# Patient Record
Sex: FEMALE | Race: OTHER | NOT HISPANIC OR LATINO
[De-identification: names, ages, dates, MRNs, and addresses within clinical notes are randomized per-mention and may not be internally consistent; named-entity substitution may affect disease eponyms.]

---

## 2020-11-30 ENCOUNTER — APPOINTMENT (OUTPATIENT)
Dept: SURGERY | Facility: CLINIC | Age: 65
End: 2020-11-30

## 2020-12-09 ENCOUNTER — APPOINTMENT (OUTPATIENT)
Dept: SURGERY | Facility: CLINIC | Age: 65
End: 2020-12-09
Payer: COMMERCIAL

## 2020-12-09 PROCEDURE — 99214 OFFICE O/P EST MOD 30 MIN: CPT | Mod: 95

## 2020-12-10 ENCOUNTER — NON-APPOINTMENT (OUTPATIENT)
Age: 65
End: 2020-12-10

## 2020-12-30 ENCOUNTER — LABORATORY RESULT (OUTPATIENT)
Age: 65
End: 2020-12-30

## 2021-01-04 ENCOUNTER — APPOINTMENT (OUTPATIENT)
Dept: SURGERY | Facility: CLINIC | Age: 66
End: 2021-01-04
Payer: COMMERCIAL

## 2021-01-04 ENCOUNTER — NON-APPOINTMENT (OUTPATIENT)
Age: 66
End: 2021-01-04

## 2021-01-04 VITALS
TEMPERATURE: 97 F | HEART RATE: 81 BPM | WEIGHT: 99 LBS | DIASTOLIC BLOOD PRESSURE: 62 MMHG | BODY MASS INDEX: 17.54 KG/M2 | HEIGHT: 63 IN | SYSTOLIC BLOOD PRESSURE: 104 MMHG

## 2021-01-04 DIAGNOSIS — Z78.9 OTHER SPECIFIED HEALTH STATUS: ICD-10-CM

## 2021-01-04 PROCEDURE — 99214 OFFICE O/P EST MOD 30 MIN: CPT

## 2021-01-04 PROCEDURE — 99072 ADDL SUPL MATRL&STAF TM PHE: CPT

## 2021-01-04 NOTE — CONSULT LETTER
[Dear  ___] : Dear  [unfilled], [Consult Letter:] : I had the pleasure of evaluating your patient, [unfilled]. [Please see my note below.] : Please see my note below. [Consult Closing:] : Thank you very much for allowing me to participate in the care of this patient.  If you have any questions, please do not hesitate to contact me. [FreeTextEntry3] : Sincerely,\par \par Christiano Malhotra MD, Colon and Rectal Surgery\par \par [employer] [facility]\par [address] [place] [address]\par [facility], 3rd Floor\par [place], [place] [address]\par Tel [phone] ext [phone]\par Fax [phone]\par

## 2021-01-04 NOTE — PHYSICAL EXAM
[Abdomen Masses] : No abdominal masses [Abdomen Tenderness] : ~T No ~M abdominal tenderness [No HSM] : no hepatosplenomegaly [Respiratory Effort] : normal respiratory effort [Normal Rate and Rhythm] : normal rate and rhythm [de-identified] : awake, alert and in no acute distress

## 2021-01-04 NOTE — HISTORY OF PRESENT ILLNESS
[FreeTextEntry1] : Patient is a 65F with no PMH who presents for evaluation of sigmoid colon adenocarcinoma.  Patient originally had a positive cologuard test and underwent colonoscopy with Dr. Barros in 11/2020.  She was found to have a 1.8cm sigmoid colon polyp that was removed.  Pathology showed moderately differentiated invasive adenocarcinoma within 1mm of the cauterized margin. Our pathology report shows adenocarcinoma extending to the inked and cauterized deep margin.  Patient was previously spoken to over the phone regarding her pathology.  CTAP shows no evidence of metastatic disease.  She presents today to discuss surgery. Patient states she feels well and is in her normal state of health. Patient denies fevers, chills, nausea, vomiting, abdominal pain, constipation, diarrhea, blood in his stool or unexpected weight loss.  Patient denies a family history of colon cancer rectal cancer or inflammatory bowel disease.

## 2021-01-04 NOTE — ASSESSMENT
[FreeTextEntry1] : 65F with adenocarcinoma containing sigmoid colon polyp and inadequate margin\par \par I had a long conversation with the patient and her son.  We discussed the inadequate margin and the current recommendation for surgical resection. We will need CT chest and CEA level.  Additionally she will be referred to Dr. Barros for colonoscopy and tattoo placement. I recommended that he undergo robot assisted laparoscopic sigmoidectomy possible open, possible ostomy.  All risks benefits and alternatives were discussed including bleeding, infection, damage to surrounding structures including the ureters, anastomotic leak, anastomotic stricture, cardiopulmonary events, thromboembolic events and hernia. We expect a 3-7 day hospital stay and 6-8 week recovery. Patient expressed understanding and was in agreement with the plan.\par

## 2021-01-05 ENCOUNTER — NON-APPOINTMENT (OUTPATIENT)
Age: 66
End: 2021-01-05

## 2021-01-05 LAB
ALBUMIN SERPL ELPH-MCNC: 4.6 G/DL
ALP BLD-CCNC: 54 U/L
ALT SERPL-CCNC: 14 U/L
ANION GAP SERPL CALC-SCNC: 12 MMOL/L
APTT BLD: 30.5 SEC
AST SERPL-CCNC: 19 U/L
BASOPHILS # BLD AUTO: 0.05 K/UL
BASOPHILS NFR BLD AUTO: 0.6 %
BILIRUB SERPL-MCNC: 0.4 MG/DL
BUN SERPL-MCNC: 11 MG/DL
CALCIUM SERPL-MCNC: 9.4 MG/DL
CEA SERPL-MCNC: 4.4 NG/ML
CHLORIDE SERPL-SCNC: 105 MMOL/L
CO2 SERPL-SCNC: 26 MMOL/L
CREAT SERPL-MCNC: 0.9 MG/DL
EOSINOPHIL # BLD AUTO: 0.15 K/UL
EOSINOPHIL NFR BLD AUTO: 1.8 %
GLUCOSE SERPL-MCNC: 122 MG/DL
HCT VFR BLD CALC: 41.8 %
HGB BLD-MCNC: 12.9 G/DL
IMM GRANULOCYTES NFR BLD AUTO: 0.5 %
LYMPHOCYTES # BLD AUTO: 1.95 K/UL
LYMPHOCYTES NFR BLD AUTO: 23.8 %
MAN DIFF?: NORMAL
MCHC RBC-ENTMCNC: 28.7 PG
MCHC RBC-ENTMCNC: 30.9 G/DL
MCV RBC AUTO: 93.1 FL
MONOCYTES # BLD AUTO: 0.58 K/UL
MONOCYTES NFR BLD AUTO: 7.1 %
NEUTROPHILS # BLD AUTO: 5.42 K/UL
NEUTROPHILS NFR BLD AUTO: 66.2 %
PLATELET # BLD AUTO: 282 K/UL
POTASSIUM SERPL-SCNC: 4.9 MMOL/L
PROT SERPL-MCNC: 6.9 G/DL
RBC # BLD: 4.49 M/UL
RBC # FLD: 14.8 %
SODIUM SERPL-SCNC: 143 MMOL/L
WBC # FLD AUTO: 8.19 K/UL

## 2021-02-04 ENCOUNTER — APPOINTMENT (OUTPATIENT)
Dept: HEMATOLOGY ONCOLOGY | Facility: CLINIC | Age: 66
End: 2021-02-04
Payer: COMMERCIAL

## 2021-02-04 ENCOUNTER — OUTPATIENT (OUTPATIENT)
Dept: OUTPATIENT SERVICES | Facility: HOSPITAL | Age: 66
LOS: 1 days | Discharge: HOME | End: 2021-02-04

## 2021-02-04 VITALS — DIASTOLIC BLOOD PRESSURE: 71 MMHG | SYSTOLIC BLOOD PRESSURE: 137 MMHG

## 2021-02-04 VITALS
BODY MASS INDEX: 17.54 KG/M2 | TEMPERATURE: 97.7 F | RESPIRATION RATE: 16 BRPM | SYSTOLIC BLOOD PRESSURE: 150 MMHG | HEART RATE: 74 BPM | HEIGHT: 63 IN | WEIGHT: 99 LBS | DIASTOLIC BLOOD PRESSURE: 75 MMHG

## 2021-02-04 PROCEDURE — 99245 OFF/OP CONSLTJ NEW/EST HI 55: CPT

## 2021-02-04 RX ORDER — NEOMYCIN SULFATE 500 MG/1
500 TABLET ORAL
Qty: 6 | Refills: 0 | Status: DISCONTINUED | COMMUNITY
Start: 2021-01-04 | End: 2021-02-04

## 2021-02-04 RX ORDER — BISACODYL 5 MG/1
5 TABLET ORAL
Qty: 4 | Refills: 0 | Status: DISCONTINUED | COMMUNITY
Start: 2021-01-04 | End: 2021-02-04

## 2021-02-04 RX ORDER — POLYETHYLENE GLYCOL 3350 17 G/17G
17 POWDER, FOR SOLUTION ORAL
Qty: 1 | Refills: 0 | Status: DISCONTINUED | COMMUNITY
Start: 2021-01-04 | End: 2021-02-04

## 2021-02-04 RX ORDER — METRONIDAZOLE 500 MG/1
500 TABLET ORAL
Qty: 6 | Refills: 0 | Status: DISCONTINUED | COMMUNITY
Start: 2021-01-04 | End: 2021-02-04

## 2021-02-05 NOTE — ASSESSMENT
[FreeTextEntry1] : In summary this is a 65 year old woman with a diagnosis of well differentiated VASU adenocarcinoma of the sigmoid colon  s/p polypectomy with involved margins. The procedure was done on 11/7/20.\par On repeat colonoscopy in 1/21 the site of the polyp was not visible and therefore could not be tattoed.\par \par Pt has been eval by surgery.\par The case was also presented at our General Tumor Board.\par Given this is an unusual situation the treatment recommendations can vary from close observation to proceeding with surgery. Both options were presented to the pt.\par I had a long discussion with the pt about pros and cons of both approaches.\par She herself prefers close observation.\par She is scheduled to undergo a PET scan later this week.\par We will repeat labs including CEA.\par Pt will have a repeat colonoscopy in 3/21.\par \par I also discussed the case with Dr Malhotra, who has decided to monitor the pt closely at this time.\par She will RTC after her next colonoscopy.

## 2021-02-05 NOTE — HISTORY OF PRESENT ILLNESS
[de-identified] : This is a 65F who underwent colonoscopy on 11/7/2020 by Dr. Barros after having a positive Cologuard test.. \par She was found to have a 1.8cm sigmoid colon polyp that was removed. Pathology showed moderately differentiated invasive adenocarcinoma within 1mm of the cauterized margin. Our pathology report shows adenocarcinoma extending to the inked and cauterized deep margin. \par \par \par She underwent CTAP on 11/24 that showed no evidence of disease. \par She has seen by Dr Malhotra and recommended sigmoid resection after repeat colonoscopy and tattooing of the site of cancer.\par However at the repeat colonoscopy the site of polyp could not be found. \par \par At this time the management of the pt is unclear. She has been advised observation vs possible surgery without preop identification of the polyp site, which could mean a larger resection than would have been needed.\par Pt does not report any change of bowel function. \par No abd pain. No wt loss.\par No bleeding [place]

## 2021-02-10 DIAGNOSIS — C18.7 MALIGNANT NEOPLASM OF SIGMOID COLON: ICD-10-CM

## 2021-02-15 ENCOUNTER — LABORATORY RESULT (OUTPATIENT)
Age: 66
End: 2021-02-15

## 2021-02-17 ENCOUNTER — APPOINTMENT (OUTPATIENT)
Dept: SURGERY | Facility: CLINIC | Age: 66
End: 2021-02-17

## 2021-02-17 LAB
ALBUMIN SERPL ELPH-MCNC: 4.2 G/DL
ALP BLD-CCNC: 54 U/L
ALT SERPL-CCNC: 12 U/L
ANION GAP SERPL CALC-SCNC: 14 MMOL/L
AST SERPL-CCNC: 16 U/L
BILIRUB SERPL-MCNC: 0.3 MG/DL
BUN SERPL-MCNC: 13 MG/DL
CALCIUM SERPL-MCNC: 9.4 MG/DL
CEA SERPL-MCNC: 5 NG/ML
CHLORIDE SERPL-SCNC: 102 MMOL/L
CO2 SERPL-SCNC: 24 MMOL/L
CREAT SERPL-MCNC: 0.8 MG/DL
GLUCOSE SERPL-MCNC: 87 MG/DL
POTASSIUM SERPL-SCNC: 4.2 MMOL/L
PROT SERPL-MCNC: 6.6 G/DL
SODIUM SERPL-SCNC: 140 MMOL/L

## 2021-02-22 ENCOUNTER — APPOINTMENT (OUTPATIENT)
Dept: SURGERY | Facility: CLINIC | Age: 66
End: 2021-02-22
Payer: SELF-PAY

## 2021-02-22 PROCEDURE — 99213 OFFICE O/P EST LOW 20 MIN: CPT | Mod: 95

## 2021-02-22 NOTE — HISTORY OF PRESENT ILLNESS
[FreeTextEntry1] : Patient is a 65F with no PMH who presents for evaluation of sigmoid colon adenocarcinoma.  Patient originally had a positive cologuard test and underwent colonoscopy with Dr. Barros in 11/2020.  She was found to have a 1.8cm sigmoid colon polyp that was removed.  Pathology showed moderately differentiated invasive adenocarcinoma within 1mm of the cauterized margin. Our pathology report shows adenocarcinoma extending to the inked and cauterized deep margin.  CTAP shows no evidence of metastatic disease.  Patient was recommended to undergo sigmoidectomy after tattoo placement.  Unfortunately the polypectomy site could not be identified endoscopically.  The patient was presented at tumor board and the recommendation was for surveillance.  Patient denies fevers, chills, nausea, vomiting, abdominal pain, constipation, diarrhea, blood in his stool or unexpected weight loss.  Patient denies a family history of colon cancer rectal cancer or inflammatory bowel disease.

## 2021-02-22 NOTE — ASSESSMENT
[FreeTextEntry1] : 65F with adenocarcinoma containing sigmoid colon polyp and inadequate margin\par \par I discussed the current situation with the patient and her son.  We discussed surveillance.  She is scheduled for repeat colonoscopy and CT in 5/2021.  We will see her back in 6/2021

## 2021-02-22 NOTE — CONSULT LETTER
[Dear  ___] : Dear  [unfilled], [Courtesy Letter:] : I had the pleasure of seeing your patient, [unfilled], in my office today. [Please see my note below.] : Please see my note below. [Consult Closing:] : Thank you very much for allowing me to participate in the care of this patient.  If you have any questions, please do not hesitate to contact me. [FreeTextEntry3] : Sincerely,\par \par Christiano Malhotra MD, Colon and Rectal Surgery\par \par [employer] at [facility]\par [address] [place] [address]\par [facility], 3rd Floor\par [place], [place] [address]\par Tel [phone] ext [phone]\par Fax [phone]\par  [DrGeovany  ___] : Dr. VARGAS

## 2021-05-06 ENCOUNTER — APPOINTMENT (OUTPATIENT)
Dept: HEMATOLOGY ONCOLOGY | Facility: CLINIC | Age: 66
End: 2021-05-06

## 2021-06-28 ENCOUNTER — APPOINTMENT (OUTPATIENT)
Dept: HEMATOLOGY ONCOLOGY | Facility: CLINIC | Age: 66
End: 2021-06-28

## 2022-06-15 ENCOUNTER — APPOINTMENT (OUTPATIENT)
Dept: GERIATRICS | Facility: CLINIC | Age: 67
End: 2022-06-15
Payer: MEDICARE

## 2022-06-15 ENCOUNTER — OUTPATIENT (OUTPATIENT)
Dept: OUTPATIENT SERVICES | Facility: HOSPITAL | Age: 67
LOS: 1 days | Discharge: HOME | End: 2022-06-15

## 2022-06-15 VITALS
WEIGHT: 97 LBS | DIASTOLIC BLOOD PRESSURE: 69 MMHG | TEMPERATURE: 97.6 F | RESPIRATION RATE: 18 BRPM | SYSTOLIC BLOOD PRESSURE: 157 MMHG | HEART RATE: 77 BPM | OXYGEN SATURATION: 95 % | BODY MASS INDEX: 14.37 KG/M2 | HEIGHT: 69 IN

## 2022-06-15 DIAGNOSIS — Z82.49 FAMILY HISTORY OF ISCHEMIC HEART DISEASE AND OTHER DISEASES OF THE CIRCULATORY SYSTEM: ICD-10-CM

## 2022-06-15 DIAGNOSIS — C80.1 MALIGNANT (PRIMARY) NEOPLASM, UNSPECIFIED: ICD-10-CM

## 2022-06-15 DIAGNOSIS — F17.200 NICOTINE DEPENDENCE, UNSPECIFIED, UNCOMPLICATED: ICD-10-CM

## 2022-06-15 PROCEDURE — 99204 OFFICE O/P NEW MOD 45 MIN: CPT | Mod: GC

## 2022-06-15 RX ORDER — CHROMIUM 200 MCG
25 MCG TABLET ORAL
Refills: 0 | Status: DISCONTINUED | COMMUNITY
Start: 2021-01-04 | End: 2022-06-15

## 2022-06-15 NOTE — ASSESSMENT
[FreeTextEntry1] : 68 yo F with PMH of sigmoid colon adenocarcinoma ( follows Dr. Parada and Dr. Malhotra) presents for initial evaluation. Last seen by by oncology and colorectal surgery last year was told no need to be followed anymore as repeat colonoscopy was normal. The patient notes that she was told she does not need to follow anymore. All ros negative. \par \par Reports wants to set up care with new pcp because she moved closer to here. \par \par \par # Preventive visit\par - referral given for mammogram / GYN for pap smear / DEXA scan\par - vaccinated for covid19 x2 with moderna, interested in booster when comes back from [place] \par \par # h/o adenocarcinoma of sigmoid colon\par - pt wants to defer follow up with oncology until after she comes back from [place] \par \par # Elevated blood pressure\par - patient notes bp at home normal\par - advised to bring logs of blood pressure for next visit \par \par # Current every day smoker\par - smoking cessation counseling advised \par - patient had PET scan done in 2021 , advised to bring the report at next visit \par \par # f/u in 3 months

## 2022-06-15 NOTE — HISTORY OF PRESENT ILLNESS
[0] : 2) Feeling down, depressed, or hopeless: Not at all (0) [PHQ-2 Negative - No further assessment needed] : PHQ-2 Negative - No further assessment needed [FreeTextEntry1] : 66 yo F with PMH of sigmoid colon adenocarcinoma ( follows Dr. Parada and Dr. Malhotra) presents for initial evaluation. Last seen by by oncology and colorectal surgery last year was told no need to be followed anymore as repeat colonoscopy was normal. The patient notes that she was told she does not need to follow anymore. Patient reports she is traveling to [place] tomorrow to visit her son. All ros negative. \par  [[id number]] : 0

## 2022-06-23 DIAGNOSIS — Z82.49 FAMILY HISTORY OF ISCHEMIC HEART DISEASE AND OTHER DISEASES OF THE CIRCULATORY SYSTEM: ICD-10-CM

## 2022-06-23 DIAGNOSIS — C18.7 MALIGNANT NEOPLASM OF SIGMOID COLON: ICD-10-CM

## 2022-06-23 DIAGNOSIS — F17.200 NICOTINE DEPENDENCE, UNSPECIFIED, UNCOMPLICATED: ICD-10-CM

## 2022-07-01 LAB
ALBUMIN SERPL ELPH-MCNC: 4.7 G/DL
ALP BLD-CCNC: 63 U/L
ALT SERPL-CCNC: 15 U/L
ANION GAP SERPL CALC-SCNC: 14 MMOL/L
AST SERPL-CCNC: 22 U/L
BASOPHILS # BLD AUTO: 0.06 K/UL
BASOPHILS NFR BLD AUTO: 0.7 %
BILIRUB SERPL-MCNC: 0.3 MG/DL
BUN SERPL-MCNC: 9 MG/DL
CALCIUM SERPL-MCNC: 9.6 MG/DL
CHLORIDE SERPL-SCNC: 101 MMOL/L
CHOLEST SERPL-MCNC: 224 MG/DL
CO2 SERPL-SCNC: 27 MMOL/L
CREAT SERPL-MCNC: 0.74 MG/DL
EGFR: 89 ML/MIN/1.73M2
EOSINOPHIL # BLD AUTO: 0.08 K/UL
EOSINOPHIL NFR BLD AUTO: 0.9 %
ESTIMATED AVERAGE GLUCOSE: 123 MG/DL
GLUCOSE SERPL-MCNC: 111 MG/DL
HBA1C MFR BLD HPLC: 5.9 %
HCT VFR BLD CALC: 41.6 %
HDLC SERPL-MCNC: 70 MG/DL
HGB BLD-MCNC: 13.4 G/DL
IMM GRANULOCYTES NFR BLD AUTO: 0.2 %
LDLC SERPL CALC-MCNC: 138 MG/DL
LYMPHOCYTES # BLD AUTO: 2.29 K/UL
LYMPHOCYTES NFR BLD AUTO: 26.8 %
MAN DIFF?: NORMAL
MCHC RBC-ENTMCNC: 29.6 PG
MCHC RBC-ENTMCNC: 32.2 G/DL
MCV RBC AUTO: 91.8 FL
MONOCYTES # BLD AUTO: 0.62 K/UL
MONOCYTES NFR BLD AUTO: 7.2 %
NEUTROPHILS # BLD AUTO: 5.49 K/UL
NEUTROPHILS NFR BLD AUTO: 64.2 %
NONHDLC SERPL-MCNC: 153 MG/DL
PLATELET # BLD AUTO: 271 K/UL
POTASSIUM SERPL-SCNC: 4.5 MMOL/L
PROT SERPL-MCNC: 7.4 G/DL
RBC # BLD: 4.53 M/UL
RBC # FLD: 14.6 %
SODIUM SERPL-SCNC: 142 MMOL/L
TRIGL SERPL-MCNC: 79 MG/DL
TSH SERPL-ACNC: 0.39 UIU/ML
WBC # FLD AUTO: 8.56 K/UL

## 2023-05-30 ENCOUNTER — LABORATORY RESULT (OUTPATIENT)
Age: 68
End: 2023-05-30

## 2023-05-31 ENCOUNTER — NON-APPOINTMENT (OUTPATIENT)
Age: 68
End: 2023-05-31

## 2023-05-31 ENCOUNTER — OUTPATIENT (OUTPATIENT)
Dept: OUTPATIENT SERVICES | Facility: HOSPITAL | Age: 68
LOS: 1 days | End: 2023-05-31
Payer: MEDICARE

## 2023-05-31 ENCOUNTER — APPOINTMENT (OUTPATIENT)
Dept: GERIATRICS | Facility: CLINIC | Age: 68
End: 2023-05-31

## 2023-05-31 ENCOUNTER — APPOINTMENT (OUTPATIENT)
Dept: GERIATRICS | Facility: CLINIC | Age: 68
End: 2023-05-31
Payer: MEDICARE

## 2023-05-31 VITALS
BODY MASS INDEX: 16.59 KG/M2 | SYSTOLIC BLOOD PRESSURE: 127 MMHG | WEIGHT: 93.6 LBS | HEIGHT: 63 IN | HEART RATE: 82 BPM | TEMPERATURE: 98.5 F | DIASTOLIC BLOOD PRESSURE: 74 MMHG | OXYGEN SATURATION: 97 %

## 2023-05-31 DIAGNOSIS — Z00.00 ENCOUNTER FOR GENERAL ADULT MEDICAL EXAMINATION W/OUT ABNORMAL FINDINGS: ICD-10-CM

## 2023-05-31 DIAGNOSIS — Z00.00 ENCOUNTER FOR GENERAL ADULT MEDICAL EXAMINATION WITHOUT ABNORMAL FINDINGS: ICD-10-CM

## 2023-05-31 DIAGNOSIS — C18.7 MALIGNANT NEOPLASM OF SIGMOID COLON: ICD-10-CM

## 2023-05-31 PROCEDURE — 99213 OFFICE O/P EST LOW 20 MIN: CPT

## 2023-05-31 PROCEDURE — 85027 COMPLETE CBC AUTOMATED: CPT

## 2023-05-31 PROCEDURE — 36415 COLL VENOUS BLD VENIPUNCTURE: CPT

## 2023-05-31 PROCEDURE — 83036 HEMOGLOBIN GLYCOSYLATED A1C: CPT

## 2023-05-31 PROCEDURE — 99213 OFFICE O/P EST LOW 20 MIN: CPT | Mod: GC

## 2023-05-31 PROCEDURE — 80061 LIPID PANEL: CPT

## 2023-05-31 PROCEDURE — 84443 ASSAY THYROID STIM HORMONE: CPT

## 2023-05-31 PROCEDURE — 80053 COMPREHEN METABOLIC PANEL: CPT

## 2023-05-31 PROCEDURE — 82378 CARCINOEMBRYONIC ANTIGEN: CPT

## 2023-05-31 NOTE — PHYSICAL EXAM
[No Acute Distress] : no acute distress [Well Nourished] : well nourished [Well Developed] : well developed [Well-Appearing] : well-appearing [PERRL] : pupils equal round and reactive to light [EOMI] : extraocular movements intact [Normal Outer Ear/Nose] : the outer ears and nose were normal in appearance [Normal Oropharynx] : the oropharynx was normal [No JVD] : no jugular venous distention [Supple] : supple [No Respiratory Distress] : no respiratory distress  [No Accessory Muscle Use] : no accessory muscle use [Clear to Auscultation] : lungs were clear to auscultation bilaterally [Normal Rate] : normal rate  [Regular Rhythm] : with a regular rhythm [Normal S1, S2] : normal S1 and S2 [No Murmur] : no murmur heard [No Edema] : there was no peripheral edema [Soft] : abdomen soft [Non Tender] : non-tender [Non-distended] : non-distended [No Masses] : no abdominal mass palpated [No CVA Tenderness] : no CVA  tenderness [No Spinal Tenderness] : no spinal tenderness [Normal] : no CVA or spinal tenderness [No Joint Swelling] : no joint swelling [Grossly Normal Strength/Tone] : grossly normal strength/tone [Coordination Grossly Intact] : coordination grossly intact [No Focal Deficits] : no focal deficits [Speech Grossly Normal] : speech grossly normal [Normal Affect] : the affect was normal [Normal Mood] : the mood was normal [Normal Insight/Judgement] : insight and judgment were intact

## 2023-06-01 NOTE — HISTORY OF PRESENT ILLNESS
[FreeTextEntry1] : 68 year-old female here for routine follow-up visit  [de-identified] : Patient is a 69 yo-female w/PMH colon adenocarcinoma ( follows Dr. Parada and Dr. Malhotra) presents for  follow-up visitLast seen by by oncology and colorectal surgery last year was told no need to be followed anymore as repeat colonoscopy was normal.\par The patient reports that her most recent colonoscopy was last year which  was normal and she is due for next colonoscopy in 3 years. She follows with GI and will be seeing them again next year.  Denies any recent weight loss. Denies any bleeding per rectum, which has been resolved since her last colonoscopy. \par Patient reports no major complaints, patient is active at home, does house work, cooking, and driving. No changes to vision or hearing, and seeing optho tomorrow.

## 2023-06-01 NOTE — PLAN
[FreeTextEntry1] : #Colonic adenocarcinoma\par -Continue surveillance per GI, patient has follow-up with GI next year in addition to colonoscopy (Having colonoscopies done by Dr. Daniel at another institution).\par \par #Preventative visit\par - Routine labs\par - GI follow-up next year\par - Patient counseled to continue healthy diet, avoid sugary drinks, and continue exercise\par -GYN referral given\par - Mammo + DEXA referrals to be completed in [place]

## 2023-06-01 NOTE — ASSESSMENT
[FreeTextEntry1] : 68 yo F with PMH of sigmoid colon adenocarcinoma ( follows Dr. Parada and Dr. Malhotra) presents for routinefollow-up visit Last seen by by oncology and colorectal surgery last year was told no need to be followed anymore as repeat colonoscopy was normal. The patient notes that she was told she does not need to follow anymore. All ROS noted to be negative on today's visit, patient is active at home and feels well overall. \par

## 2023-06-01 NOTE — REVIEW OF SYSTEMS
4 [Fever] : no fever [Chills] : no chills [Fatigue] : no fatigue [Night Sweats] : no night sweats [Recent Change In Weight] : ~T no recent weight change [Pain] : no pain [Vision Problems] : no vision problems [Chest Pain] : no chest pain [Palpitations] : no palpitations [Lower Ext Edema] : no lower extremity edema [Orthopnea] : no orthopnea [Shortness Of Breath] : no shortness of breath [Wheezing] : no wheezing [Abdominal Pain] : no abdominal pain [Cough] : no cough [Nausea] : no nausea [Constipation] : no constipation [Diarrhea] : diarrhea [Vomiting] : no vomiting [Melena] : no melena [Dysuria] : no dysuria [Incontinence] : no incontinence [Frequency] : no frequency [Joint Pain] : no joint pain [Joint Stiffness] : no joint stiffness [Muscle Pain] : no muscle pain

## 2023-06-02 ENCOUNTER — OUTPATIENT (OUTPATIENT)
Dept: OUTPATIENT SERVICES | Facility: HOSPITAL | Age: 68
LOS: 1 days | End: 2023-06-02
Payer: MEDICARE

## 2023-06-02 ENCOUNTER — RESULT REVIEW (OUTPATIENT)
Age: 68
End: 2023-06-02

## 2023-06-02 DIAGNOSIS — Z13.820 ENCOUNTER FOR SCREENING FOR OSTEOPOROSIS: ICD-10-CM

## 2023-06-02 PROCEDURE — 77080 DXA BONE DENSITY AXIAL: CPT

## 2023-06-03 DIAGNOSIS — Z13.820 ENCOUNTER FOR SCREENING FOR OSTEOPOROSIS: ICD-10-CM

## 2024-02-26 NOTE — PHYSICAL EXAM
0 (no pain/absence of nonverbal indicators of pain) [Fully active, able to carry on all pre-disease performance without restriction] : Status 0 - Fully active, able to carry on all pre-disease performance without restriction [Normal] : affect appropriate

## 2024-06-12 ENCOUNTER — APPOINTMENT (OUTPATIENT)
Dept: GERIATRICS | Facility: CLINIC | Age: 69
End: 2024-06-12
Payer: MEDICARE

## 2024-06-12 VITALS
TEMPERATURE: 98.2 F | DIASTOLIC BLOOD PRESSURE: 78 MMHG | BODY MASS INDEX: 16.32 KG/M2 | SYSTOLIC BLOOD PRESSURE: 132 MMHG | WEIGHT: 92.13 LBS | HEIGHT: 63 IN

## 2024-06-12 DIAGNOSIS — F17.200 NICOTINE DEPENDENCE, UNSPECIFIED, UNCOMPLICATED: ICD-10-CM

## 2024-06-12 DIAGNOSIS — C18.7 MALIGNANT NEOPLASM OF SIGMOID COLON: ICD-10-CM

## 2024-06-12 DIAGNOSIS — J44.9 CHRONIC OBSTRUCTIVE PULMONARY DISEASE, UNSPECIFIED: ICD-10-CM

## 2024-06-12 PROCEDURE — G0439: CPT

## 2024-06-12 RX ORDER — ALBUTEROL SULFATE 90 UG/1
108 (90 BASE) AEROSOL, METERED RESPIRATORY (INHALATION) EVERY 4 HOURS
Qty: 3 | Refills: 3 | Status: ACTIVE | COMMUNITY
Start: 2024-06-12 | End: 1900-01-01

## 2024-06-12 NOTE — PHYSICAL EXAM
[Alert] : alert [Healthy Appearing] : healthy appearing [No Acute Distress] : in no acute distress [Normal Voice/Communication] : normal voice/communication [Sclera] : the sclera and conjunctiva were normal [EOMI] : extraocular movements were intact [Normal Outer Ear/Nose] : the ears and nose were normal in appearance [No Neck Mass] : no neck mass was observed [Supple] : the neck was supple [JVD] : there was jugular-venous distention [No Respiratory Distress] : no respiratory distress [Respiration, Rhythm And Depth] : normal respiratory rhythm and effort [Auscultation Breath Sounds / Voice Sounds] : lungs were clear to auscultation bilaterally [Normal S1, S2] : normal S1 and S2 [Heart Rate And Rhythm] : heart rate was normal and rhythm regular [Heart Sounds Gallop] : no gallops [Edema] : edema was not present [Pedal Pulses Normal] : the pedal pulses are present [No Varicosities] : there were no varicosital changes [Normal Appearance] : normal in appearance [Breast Palpation Mass] : no palpable masses [Abdomen Tenderness] : non-tender [Abdomen Soft] : soft [Axillary Lymph Nodes Enlarged Bilaterally] : axillary [No CVA Tenderness] : no CVA  tenderness [No Spinal Tenderness] : no spinal tenderness [Normal Gait] : normal gait [No Clubbing, Cyanosis] : no clubbing or cyanosis of the fingernails [Involuntary Movements] : no involuntary movements were seen [Motor Tone] : muscle strength and tone were normal [Normal Color / Pigmentation] : normal skin color and pigmentation [No Focal Deficits] : no focal deficits [Oriented To Time, Place, And Person] : oriented to person, place, and time [Normal Affect] : the affect was normal [Recent Memory Normal] : recent memory was not impaired [Normal Insight/Judgment] : insight and judgment were intact [Normal Mood] : the mood was normal [Remote Memory Normal] : remote memory was not impaired [de-identified] : no axillary nodes palpable

## 2024-06-12 NOTE — REVIEW OF SYSTEMS
[Abdominal Pain] : no abdominal pain [Constipation] : no constipation [Diarrhea] : no diarrhea [Negative] : Heme/Lymph

## 2024-06-12 NOTE — HISTORY OF PRESENT ILLNESS
[FreeTextEntry1] : 70 yo AA F doing very well, no recent hospitalization, no new chronic medication, weight stable, but fluctuating depending on the diet. no falls, no SOB, no cough, no chest pain. She is still a smoker, " a little more than a pack a day", but motivated to quit. She had bronchitis which was treated at the urgent care center, CXR showed COPD-related change, started on Ventolin PRN. She received RSV vaccine 06/11/2024.   She has a follow up with Dr Jack.  [Patient reported osteoporosis screening was normal] : Patient reported osteoporosis screening was normal [Patient reported hearing was normal] : Patient reported hearing was normal [Patient reported colon/rectal/cancer screening was normal] : Patient reported colon/rectal cancer screening was normal [TextBox_25] : osteopenia [TextBox_37] : wears glasses [TextBox_19] : follows, hx of adenocarcinoma [FreeTextEntry4] : referred [FreeTextEntry6] : referred [No falls in past year] : Patient reported no falls in the past year [Completely Independent] : Completely independent. [0] : 2) Feeling down, depressed, or hopeless: Not at all (0) [PHQ-2 Negative - No further assessment needed] : PHQ-2 Negative - No further assessment needed [[id number]] : 0

## 2024-06-12 NOTE — ASSESSMENT
[FreeTextEntry1] : #Hx of adenocarcinoma of the colon: normal colonoscopy in 2022 follows with dr Barros, has follow up scheduled, past records reviewed  #Current smoker, COPD change on CXR: use albuterol PRN, will need CT chest for lung CA screening - counselling about quitting provided  - albuterol refill given  #Osteopenia: will monitor with DEXA every 5 years  #HCM: screening mammography, gyn referral, CT chest, blood work given

## 2024-10-10 ENCOUNTER — OUTPATIENT (OUTPATIENT)
Dept: OUTPATIENT SERVICES | Facility: HOSPITAL | Age: 69
LOS: 1 days | End: 2024-10-10

## 2024-10-10 DIAGNOSIS — Z00.00 ENCOUNTER FOR GENERAL ADULT MEDICAL EXAMINATION WITHOUT ABNORMAL FINDINGS: ICD-10-CM

## 2024-10-11 DIAGNOSIS — Z00.00 ENCOUNTER FOR GENERAL ADULT MEDICAL EXAMINATION WITHOUT ABNORMAL FINDINGS: ICD-10-CM

## 2025-01-01 ENCOUNTER — RESULT REVIEW (OUTPATIENT)
Age: 70
End: 2025-01-01

## 2025-01-01 ENCOUNTER — INPATIENT (INPATIENT)
Facility: HOSPITAL | Age: 70
LOS: 0 days | DRG: 853 | End: 2025-04-10
Attending: SURGERY | Admitting: SURGERY
Payer: MEDICARE

## 2025-01-01 VITALS
HEART RATE: 113 BPM | RESPIRATION RATE: 18 BRPM | DIASTOLIC BLOOD PRESSURE: 88 MMHG | SYSTOLIC BLOOD PRESSURE: 125 MMHG | WEIGHT: 89.95 LBS | OXYGEN SATURATION: 93 %

## 2025-01-01 VITALS — OXYGEN SATURATION: 100 %

## 2025-01-01 DIAGNOSIS — A41.9 SEPSIS, UNSPECIFIED ORGANISM: ICD-10-CM

## 2025-01-01 LAB
-  K. PNEUMONIAE GROUP: SIGNIFICANT CHANGE UP
ABO RH CONFIRMATION: SIGNIFICANT CHANGE UP
ALBUMIN SERPL ELPH-MCNC: 1.5 G/DL — LOW (ref 3.5–5.2)
ALBUMIN SERPL ELPH-MCNC: 3.8 G/DL — SIGNIFICANT CHANGE UP (ref 3.5–5.2)
ALP SERPL-CCNC: 75 U/L — SIGNIFICANT CHANGE UP (ref 30–115)
ALP SERPL-CCNC: 88 U/L — SIGNIFICANT CHANGE UP (ref 30–115)
ALT FLD-CCNC: 20 U/L — SIGNIFICANT CHANGE UP (ref 0–41)
ALT FLD-CCNC: 51 U/L — HIGH (ref 0–41)
ANION GAP SERPL CALC-SCNC: 10 MMOL/L — SIGNIFICANT CHANGE UP (ref 7–14)
ANION GAP SERPL CALC-SCNC: 16 MMOL/L — HIGH (ref 7–14)
ANION GAP SERPL CALC-SCNC: 6 MMOL/L — LOW (ref 7–14)
APTT BLD: 27 SEC — SIGNIFICANT CHANGE UP (ref 27–39.2)
APTT BLD: 52.5 SEC — HIGH (ref 27–39.2)
AST SERPL-CCNC: 27 U/L — SIGNIFICANT CHANGE UP (ref 0–41)
AST SERPL-CCNC: 83 U/L — HIGH (ref 0–41)
BASOPHILS # BLD AUTO: 0 K/UL — SIGNIFICANT CHANGE UP (ref 0–0.2)
BASOPHILS # BLD AUTO: 0.04 K/UL — SIGNIFICANT CHANGE UP (ref 0–0.2)
BASOPHILS NFR BLD AUTO: 0 % — SIGNIFICANT CHANGE UP (ref 0–1)
BASOPHILS NFR BLD AUTO: 0.8 % — SIGNIFICANT CHANGE UP (ref 0–1)
BILIRUB DIRECT SERPL-MCNC: 0.7 MG/DL — HIGH (ref 0–0.3)
BILIRUB INDIRECT FLD-MCNC: 0.3 MG/DL — SIGNIFICANT CHANGE UP (ref 0.2–1.2)
BILIRUB SERPL-MCNC: 0.9 MG/DL — SIGNIFICANT CHANGE UP (ref 0.2–1.2)
BILIRUB SERPL-MCNC: 1 MG/DL — SIGNIFICANT CHANGE UP (ref 0.2–1.2)
BUN SERPL-MCNC: 20 MG/DL — SIGNIFICANT CHANGE UP (ref 10–20)
BUN SERPL-MCNC: 23 MG/DL — HIGH (ref 10–20)
BUN SERPL-MCNC: 28 MG/DL — HIGH (ref 10–20)
CALCIUM SERPL-MCNC: 10.2 MG/DL — SIGNIFICANT CHANGE UP (ref 8.4–10.5)
CALCIUM SERPL-MCNC: 7.4 MG/DL — LOW (ref 8.4–10.5)
CALCIUM SERPL-MCNC: 7.6 MG/DL — LOW (ref 8.4–10.5)
CHLORIDE SERPL-SCNC: 110 MMOL/L — SIGNIFICANT CHANGE UP (ref 98–110)
CHLORIDE SERPL-SCNC: 112 MMOL/L — HIGH (ref 98–110)
CHLORIDE SERPL-SCNC: 94 MMOL/L — LOW (ref 98–110)
CO2 SERPL-SCNC: 15 MMOL/L — LOW (ref 17–32)
CO2 SERPL-SCNC: 21 MMOL/L — SIGNIFICANT CHANGE UP (ref 17–32)
CO2 SERPL-SCNC: 25 MMOL/L — SIGNIFICANT CHANGE UP (ref 17–32)
CREAT SERPL-MCNC: 1.1 MG/DL — SIGNIFICANT CHANGE UP (ref 0.7–1.5)
CREAT SERPL-MCNC: 1.3 MG/DL — SIGNIFICANT CHANGE UP (ref 0.7–1.5)
CREAT SERPL-MCNC: 1.7 MG/DL — HIGH (ref 0.7–1.5)
EGFR: 32 ML/MIN/1.73M2 — LOW
EGFR: 32 ML/MIN/1.73M2 — LOW
EGFR: 45 ML/MIN/1.73M2 — LOW
EGFR: 45 ML/MIN/1.73M2 — LOW
EGFR: 55 ML/MIN/1.73M2 — LOW
EGFR: 55 ML/MIN/1.73M2 — LOW
EOSINOPHIL # BLD AUTO: 0 K/UL — SIGNIFICANT CHANGE UP (ref 0–0.7)
EOSINOPHIL # BLD AUTO: 0 K/UL — SIGNIFICANT CHANGE UP (ref 0–0.7)
EOSINOPHIL NFR BLD AUTO: 0 % — SIGNIFICANT CHANGE UP (ref 0–8)
EOSINOPHIL NFR BLD AUTO: 0 % — SIGNIFICANT CHANGE UP (ref 0–8)
GAS PNL BLDA: SIGNIFICANT CHANGE UP
GLUCOSE BLDC GLUCOMTR-MCNC: 12 MG/DL — CRITICAL LOW (ref 70–99)
GLUCOSE BLDC GLUCOMTR-MCNC: 13 MG/DL — CRITICAL LOW (ref 70–99)
GLUCOSE BLDC GLUCOMTR-MCNC: 145 MG/DL — HIGH (ref 70–99)
GLUCOSE BLDC GLUCOMTR-MCNC: 15 MG/DL — CRITICAL LOW (ref 70–99)
GLUCOSE BLDC GLUCOMTR-MCNC: 211 MG/DL — HIGH (ref 70–99)
GLUCOSE BLDC GLUCOMTR-MCNC: 26 MG/DL — CRITICAL LOW (ref 70–99)
GLUCOSE BLDC GLUCOMTR-MCNC: 31 MG/DL — CRITICAL LOW (ref 70–99)
GLUCOSE BLDC GLUCOMTR-MCNC: 33 MG/DL — CRITICAL LOW (ref 70–99)
GLUCOSE BLDC GLUCOMTR-MCNC: 35 MG/DL — CRITICAL LOW (ref 70–99)
GLUCOSE BLDC GLUCOMTR-MCNC: 365 MG/DL — HIGH (ref 70–99)
GLUCOSE BLDC GLUCOMTR-MCNC: 37 MG/DL — CRITICAL LOW (ref 70–99)
GLUCOSE BLDC GLUCOMTR-MCNC: >600 MG/DL — CRITICAL HIGH (ref 70–99)
GLUCOSE SERPL-MCNC: 116 MG/DL — HIGH (ref 70–99)
GLUCOSE SERPL-MCNC: 68 MG/DL — LOW (ref 70–99)
GLUCOSE SERPL-MCNC: 95 MG/DL — SIGNIFICANT CHANGE UP (ref 70–99)
GRAM STN FLD: ABNORMAL
HCT VFR BLD CALC: 38.2 % — SIGNIFICANT CHANGE UP (ref 37–47)
HCT VFR BLD CALC: 46.3 % — SIGNIFICANT CHANGE UP (ref 37–47)
HGB BLD-MCNC: 12.5 G/DL — SIGNIFICANT CHANGE UP (ref 12–16)
HGB BLD-MCNC: 14.9 G/DL — SIGNIFICANT CHANGE UP (ref 12–16)
IMM GRANULOCYTES NFR BLD AUTO: 0.2 % — SIGNIFICANT CHANGE UP (ref 0.1–0.3)
INR BLD: 1.29 RATIO — SIGNIFICANT CHANGE UP (ref 0.65–1.3)
INR BLD: 1.68 RATIO — HIGH (ref 0.65–1.3)
LACTATE SERPL-SCNC: 4.7 MMOL/L — CRITICAL HIGH (ref 0.7–2)
LACTATE SERPL-SCNC: 4.9 MMOL/L — CRITICAL HIGH (ref 0.7–2)
LIDOCAIN IGE QN: 6 U/L — LOW (ref 7–60)
LYMPHOCYTES # BLD AUTO: 0.15 K/UL — LOW (ref 1.2–3.4)
LYMPHOCYTES # BLD AUTO: 0.72 K/UL — LOW (ref 1.2–3.4)
LYMPHOCYTES # BLD AUTO: 14.9 % — LOW (ref 20.5–51.1)
LYMPHOCYTES # BLD AUTO: 5.8 % — LOW (ref 20.5–51.1)
MAGNESIUM SERPL-MCNC: 2.2 MG/DL — SIGNIFICANT CHANGE UP (ref 1.8–2.4)
MCHC RBC-ENTMCNC: 29.4 PG — SIGNIFICANT CHANGE UP (ref 27–31)
MCHC RBC-ENTMCNC: 30.7 PG — SIGNIFICANT CHANGE UP (ref 27–31)
MCHC RBC-ENTMCNC: 32.2 G/DL — SIGNIFICANT CHANGE UP (ref 32–37)
MCHC RBC-ENTMCNC: 32.7 G/DL — SIGNIFICANT CHANGE UP (ref 32–37)
MCV RBC AUTO: 91.5 FL — SIGNIFICANT CHANGE UP (ref 81–99)
MCV RBC AUTO: 93.9 FL — SIGNIFICANT CHANGE UP (ref 81–99)
METHOD TYPE: SIGNIFICANT CHANGE UP
MONOCYTES # BLD AUTO: 0.13 K/UL — SIGNIFICANT CHANGE UP (ref 0.1–0.6)
MONOCYTES # BLD AUTO: 0.23 K/UL — SIGNIFICANT CHANGE UP (ref 0.1–0.6)
MONOCYTES NFR BLD AUTO: 4.8 % — SIGNIFICANT CHANGE UP (ref 1.7–9.3)
MONOCYTES NFR BLD AUTO: 4.8 % — SIGNIFICANT CHANGE UP (ref 1.7–9.3)
NEUTROPHILS # BLD AUTO: 2.03 K/UL — SIGNIFICANT CHANGE UP (ref 1.4–6.5)
NEUTROPHILS # BLD AUTO: 3.83 K/UL — SIGNIFICANT CHANGE UP (ref 1.4–6.5)
NEUTROPHILS NFR BLD AUTO: 74.8 % — SIGNIFICANT CHANGE UP (ref 42.2–75.2)
NEUTROPHILS NFR BLD AUTO: 79.3 % — HIGH (ref 42.2–75.2)
NRBC BLD AUTO-RTO: 0 /100 WBCS — SIGNIFICANT CHANGE UP (ref 0–0)
PHOSPHATE SERPL-MCNC: 3.3 MG/DL — SIGNIFICANT CHANGE UP (ref 2.1–4.9)
PLATELET # BLD AUTO: 156 K/UL — SIGNIFICANT CHANGE UP (ref 130–400)
PLATELET # BLD AUTO: 329 K/UL — SIGNIFICANT CHANGE UP (ref 130–400)
PMV BLD: 10.3 FL — SIGNIFICANT CHANGE UP (ref 7.4–10.4)
PMV BLD: 9.7 FL — SIGNIFICANT CHANGE UP (ref 7.4–10.4)
POTASSIUM SERPL-MCNC: 5.6 MMOL/L — HIGH (ref 3.5–5)
POTASSIUM SERPL-MCNC: 5.8 MMOL/L — HIGH (ref 3.5–5)
POTASSIUM SERPL-MCNC: 7.4 MMOL/L — CRITICAL HIGH (ref 3.5–5)
POTASSIUM SERPL-SCNC: 5.6 MMOL/L — HIGH (ref 3.5–5)
POTASSIUM SERPL-SCNC: 5.8 MMOL/L — HIGH (ref 3.5–5)
POTASSIUM SERPL-SCNC: 7.4 MMOL/L — CRITICAL HIGH (ref 3.5–5)
PROT SERPL-MCNC: 2.3 G/DL — LOW (ref 6–8)
PROT SERPL-MCNC: 6.4 G/DL — SIGNIFICANT CHANGE UP (ref 6–8)
PROTHROM AB SERPL-ACNC: 15.3 SEC — HIGH (ref 9.95–12.87)
PROTHROM AB SERPL-ACNC: 20 SEC — HIGH (ref 9.95–12.87)
RBC # BLD: 4.07 M/UL — LOW (ref 4.2–5.4)
RBC # BLD: 5.06 M/UL — SIGNIFICANT CHANGE UP (ref 4.2–5.4)
RBC # FLD: 14.1 % — SIGNIFICANT CHANGE UP (ref 11.5–14.5)
RBC # FLD: 14.2 % — SIGNIFICANT CHANGE UP (ref 11.5–14.5)
SODIUM SERPL-SCNC: 135 MMOL/L — SIGNIFICANT CHANGE UP (ref 135–146)
SODIUM SERPL-SCNC: 137 MMOL/L — SIGNIFICANT CHANGE UP (ref 135–146)
SODIUM SERPL-SCNC: 137 MMOL/L — SIGNIFICANT CHANGE UP (ref 135–146)
SPECIMEN SOURCE: SIGNIFICANT CHANGE UP
TROPONIN T, HIGH SENSITIVITY RESULT: 313 NG/L — CRITICAL HIGH (ref 6–13)
WBC # BLD: 2.65 K/UL — LOW (ref 4.8–10.8)
WBC # BLD: 4.83 K/UL — SIGNIFICANT CHANGE UP (ref 4.8–10.8)
WBC # FLD AUTO: 2.65 K/UL — LOW (ref 4.8–10.8)
WBC # FLD AUTO: 4.83 K/UL — SIGNIFICANT CHANGE UP (ref 4.8–10.8)

## 2025-01-01 PROCEDURE — P9016: CPT

## 2025-01-01 PROCEDURE — 74177 CT ABD & PELVIS W/CONTRAST: CPT | Mod: 26

## 2025-01-01 PROCEDURE — 83605 ASSAY OF LACTIC ACID: CPT

## 2025-01-01 PROCEDURE — 82803 BLOOD GASES ANY COMBINATION: CPT

## 2025-01-01 PROCEDURE — 36620 INSERTION CATHETER ARTERY: CPT

## 2025-01-01 PROCEDURE — 71045 X-RAY EXAM CHEST 1 VIEW: CPT | Mod: 26

## 2025-01-01 PROCEDURE — 36430 TRANSFUSION BLD/BLD COMPNT: CPT

## 2025-01-01 PROCEDURE — 93005 ELECTROCARDIOGRAM TRACING: CPT

## 2025-01-01 PROCEDURE — 85014 HEMATOCRIT: CPT

## 2025-01-01 PROCEDURE — 93306 TTE W/DOPPLER COMPLETE: CPT

## 2025-01-01 PROCEDURE — 80076 HEPATIC FUNCTION PANEL: CPT

## 2025-01-01 PROCEDURE — C1751: CPT

## 2025-01-01 PROCEDURE — 88307 TISSUE EXAM BY PATHOLOGIST: CPT | Mod: 26

## 2025-01-01 PROCEDURE — 99291 CRITICAL CARE FIRST HOUR: CPT

## 2025-01-01 PROCEDURE — 93306 TTE W/DOPPLER COMPLETE: CPT | Mod: 26

## 2025-01-01 PROCEDURE — C1889: CPT

## 2025-01-01 PROCEDURE — 86901 BLOOD TYPING SEROLOGIC RH(D): CPT

## 2025-01-01 PROCEDURE — 80048 BASIC METABOLIC PNL TOTAL CA: CPT

## 2025-01-01 PROCEDURE — 83735 ASSAY OF MAGNESIUM: CPT

## 2025-01-01 PROCEDURE — 86923 COMPATIBILITY TEST ELECTRIC: CPT

## 2025-01-01 PROCEDURE — 85025 COMPLETE CBC W/AUTO DIFF WBC: CPT

## 2025-01-01 PROCEDURE — 84132 ASSAY OF SERUM POTASSIUM: CPT

## 2025-01-01 PROCEDURE — 84484 ASSAY OF TROPONIN QUANT: CPT

## 2025-01-01 PROCEDURE — 99291 CRITICAL CARE FIRST HOUR: CPT | Mod: 24,25,57

## 2025-01-01 PROCEDURE — 82962 GLUCOSE BLOOD TEST: CPT

## 2025-01-01 PROCEDURE — 36415 COLL VENOUS BLD VENIPUNCTURE: CPT

## 2025-01-01 PROCEDURE — 49084 PERITONEAL LAVAGE: CPT

## 2025-01-01 PROCEDURE — 71045 X-RAY EXAM CHEST 1 VIEW: CPT

## 2025-01-01 PROCEDURE — 82330 ASSAY OF CALCIUM: CPT

## 2025-01-01 PROCEDURE — 44144 PARTIAL REMOVAL OF COLON: CPT

## 2025-01-01 PROCEDURE — 85730 THROMBOPLASTIN TIME PARTIAL: CPT

## 2025-01-01 PROCEDURE — 84295 ASSAY OF SERUM SODIUM: CPT

## 2025-01-01 PROCEDURE — 93010 ELECTROCARDIOGRAM REPORT: CPT | Mod: 76

## 2025-01-01 PROCEDURE — P9045: CPT | Mod: JZ

## 2025-01-01 PROCEDURE — 86900 BLOOD TYPING SEROLOGIC ABO: CPT

## 2025-01-01 PROCEDURE — 71045 X-RAY EXAM CHEST 1 VIEW: CPT | Mod: 26,77

## 2025-01-01 PROCEDURE — 86850 RBC ANTIBODY SCREEN: CPT

## 2025-01-01 PROCEDURE — 85610 PROTHROMBIN TIME: CPT

## 2025-01-01 PROCEDURE — 85018 HEMOGLOBIN: CPT

## 2025-01-01 PROCEDURE — 88307 TISSUE EXAM BY PATHOLOGIST: CPT

## 2025-01-01 PROCEDURE — 36556 INSERT NON-TUNNEL CV CATH: CPT

## 2025-01-01 PROCEDURE — 99223 1ST HOSP IP/OBS HIGH 75: CPT | Mod: 57

## 2025-01-01 PROCEDURE — 84100 ASSAY OF PHOSPHORUS: CPT

## 2025-01-01 PROCEDURE — 74018 RADEX ABDOMEN 1 VIEW: CPT | Mod: 26

## 2025-01-01 RX ORDER — SODIUM BICARBONATE 1 MEQ/ML
50 SYRINGE (ML) INTRAVENOUS ONCE
Refills: 0 | Status: COMPLETED | OUTPATIENT
Start: 2025-01-01 | End: 2025-01-01

## 2025-01-01 RX ORDER — SODIUM CHLORIDE 9 G/1000ML
500 INJECTION, SOLUTION INTRAVENOUS ONCE
Refills: 0 | Status: COMPLETED | OUTPATIENT
Start: 2025-01-01 | End: 2025-01-01

## 2025-01-01 RX ORDER — SODIUM ZIRCONIUM CYCLOSILICATE 5 G/5G
10 POWDER, FOR SUSPENSION ORAL EVERY 8 HOURS
Refills: 0 | Status: DISCONTINUED | OUTPATIENT
Start: 2025-01-01 | End: 2025-01-01

## 2025-01-01 RX ORDER — PIPERACILLIN-TAZO-DEXTROSE,ISO 3.375G/5
3.38 IV SOLUTION, PIGGYBACK PREMIX FROZEN(ML) INTRAVENOUS EVERY 8 HOURS
Refills: 0 | Status: DISCONTINUED | OUTPATIENT
Start: 2025-01-01 | End: 2025-01-01

## 2025-01-01 RX ORDER — ALBUMIN (HUMAN) 12.5 G/50ML
250 INJECTION, SOLUTION INTRAVENOUS EVERY 6 HOURS
Refills: 0 | Status: DISCONTINUED | OUTPATIENT
Start: 2025-01-01 | End: 2025-01-01

## 2025-01-01 RX ORDER — DEXMEDETOMIDINE HYDROCHLORIDE IN SODIUM CHLORIDE 4 UG/ML
0.2 INJECTION INTRAVENOUS
Qty: 400 | Refills: 0 | Status: DISCONTINUED | OUTPATIENT
Start: 2025-01-01 | End: 2025-01-01

## 2025-01-01 RX ORDER — HYDROMORPHONE/SOD CHLOR,ISO/PF 2 MG/10 ML
0.5 SYRINGE (ML) INJECTION ONCE
Refills: 0 | Status: DISCONTINUED | OUTPATIENT
Start: 2025-01-01 | End: 2025-01-01

## 2025-01-01 RX ORDER — DEXTROSE 50 % IN WATER 50 %
25 SYRINGE (ML) INTRAVENOUS ONCE
Refills: 0 | Status: COMPLETED | OUTPATIENT
Start: 2025-01-01 | End: 2025-01-01

## 2025-01-01 RX ORDER — CALCIUM GLUCONATE 20 MG/ML
1 INJECTION, SOLUTION INTRAVENOUS ONCE
Refills: 0 | Status: COMPLETED | OUTPATIENT
Start: 2025-01-01 | End: 2025-01-01

## 2025-01-01 RX ORDER — VASOPRESSIN 20 [USP'U]/ML
0.04 INJECTION INTRAVENOUS
Qty: 40 | Refills: 0 | Status: DISCONTINUED | OUTPATIENT
Start: 2025-01-01 | End: 2025-01-01

## 2025-01-01 RX ORDER — HEPARIN SODIUM 1000 [USP'U]/ML
5000 INJECTION INTRAVENOUS; SUBCUTANEOUS EVERY 8 HOURS
Refills: 0 | Status: DISCONTINUED | OUTPATIENT
Start: 2025-01-01 | End: 2025-01-01

## 2025-01-01 RX ORDER — SODIUM BICARBONATE 1 MEQ/ML
0.33 SYRINGE (ML) INTRAVENOUS
Qty: 150 | Refills: 0 | Status: DISCONTINUED | OUTPATIENT
Start: 2025-01-01 | End: 2025-01-01

## 2025-01-01 RX ORDER — FENTANYL CITRATE-0.9 % NACL/PF 100MCG/2ML
12.5 SYRINGE (ML) INTRAVENOUS
Refills: 0 | Status: DISCONTINUED | OUTPATIENT
Start: 2025-01-01 | End: 2025-01-01

## 2025-01-01 RX ORDER — DEXTROSE 50 % IN WATER 50 %
50 SYRINGE (ML) INTRAVENOUS ONCE
Refills: 0 | Status: COMPLETED | OUTPATIENT
Start: 2025-01-01 | End: 2025-01-01

## 2025-01-01 RX ORDER — IOHEXOL 350 MG/ML
30 INJECTION, SOLUTION INTRAVENOUS ONCE
Refills: 0 | Status: DISCONTINUED | OUTPATIENT
Start: 2025-01-01 | End: 2025-01-01

## 2025-01-01 RX ORDER — SODIUM CHLORIDE 9 G/1000ML
1000 INJECTION, SOLUTION INTRAVENOUS
Refills: 0 | Status: DISCONTINUED | OUTPATIENT
Start: 2025-01-01 | End: 2025-01-01

## 2025-01-01 RX ORDER — PIPERACILLIN-TAZO-DEXTROSE,ISO 3.375G/5
3.38 IV SOLUTION, PIGGYBACK PREMIX FROZEN(ML) INTRAVENOUS ONCE
Refills: 0 | Status: COMPLETED | OUTPATIENT
Start: 2025-01-01 | End: 2025-01-01

## 2025-01-01 RX ORDER — ONDANSETRON HCL/PF 4 MG/2 ML
4 VIAL (ML) INJECTION ONCE
Refills: 0 | Status: DISCONTINUED | OUTPATIENT
Start: 2025-01-01 | End: 2025-01-01

## 2025-01-01 RX ORDER — NOREPINEPHRINE BITARTRATE 8 MG
0.05 SOLUTION INTRAVENOUS
Qty: 16 | Refills: 0 | Status: DISCONTINUED | OUTPATIENT
Start: 2025-01-01 | End: 2025-01-01

## 2025-01-01 RX ORDER — VASOPRESSIN 20 [USP'U]/ML
0.03 INJECTION INTRAVENOUS
Qty: 40 | Refills: 0 | Status: DISCONTINUED | OUTPATIENT
Start: 2025-01-01 | End: 2025-01-01

## 2025-01-01 RX ADMIN — Medication 50 MILLIEQUIVALENT(S): at 11:18

## 2025-01-01 RX ADMIN — Medication 1000 MILLILITER(S): at 20:10

## 2025-01-01 RX ADMIN — SODIUM CHLORIDE 100 MILLILITER(S): 9 INJECTION, SOLUTION INTRAVENOUS at 04:40

## 2025-01-01 RX ADMIN — Medication 50 MILLILITER(S): at 13:25

## 2025-01-01 RX ADMIN — Medication 40 MILLIGRAM(S): at 12:47

## 2025-01-01 RX ADMIN — SODIUM CHLORIDE 1000 MILLILITER(S): 9 INJECTION, SOLUTION INTRAVENOUS at 09:45

## 2025-01-01 RX ADMIN — CALCIUM GLUCONATE 100 GRAM(S): 20 INJECTION, SOLUTION INTRAVENOUS at 08:19

## 2025-01-01 RX ADMIN — Medication 50 MILLILITER(S): at 08:21

## 2025-01-01 RX ADMIN — Medication 0.5 MILLIGRAM(S): at 19:46

## 2025-01-01 RX ADMIN — HEPARIN SODIUM 5000 UNIT(S): 1000 INJECTION INTRAVENOUS; SUBCUTANEOUS at 15:11

## 2025-01-01 RX ADMIN — Medication 25 GRAM(S): at 14:10

## 2025-01-01 RX ADMIN — NOREPINEPHRINE BITARTRATE 1.91 MICROGRAM(S)/KG/MIN: 8 SOLUTION at 04:40

## 2025-01-01 RX ADMIN — SODIUM CHLORIDE 1000 MILLILITER(S): 9 INJECTION, SOLUTION INTRAVENOUS at 09:00

## 2025-01-01 RX ADMIN — Medication 25 GRAM(S): at 15:11

## 2025-01-01 RX ADMIN — HEPARIN SODIUM 5000 UNIT(S): 1000 INJECTION INTRAVENOUS; SUBCUTANEOUS at 10:30

## 2025-01-01 RX ADMIN — SODIUM CHLORIDE 1000 MILLILITER(S): 9 INJECTION, SOLUTION INTRAVENOUS at 06:10

## 2025-01-01 RX ADMIN — Medication 10 UNIT(S): at 08:21

## 2025-01-01 RX ADMIN — SODIUM CHLORIDE 1000 MILLILITER(S): 9 INJECTION, SOLUTION INTRAVENOUS at 14:26

## 2025-01-01 RX ADMIN — Medication 10 UNIT(S): at 14:25

## 2025-01-01 RX ADMIN — Medication 200 GRAM(S): at 20:27

## 2025-01-01 RX ADMIN — Medication 25 GRAM(S): at 06:21

## 2025-01-01 RX ADMIN — ALBUMIN (HUMAN) 125 MILLILITER(S): 12.5 INJECTION, SOLUTION INTRAVENOUS at 11:18

## 2025-01-01 RX ADMIN — Medication 90 MEQ/KG/HR: at 11:19

## 2025-04-09 NOTE — ED ADULT NURSE REASSESSMENT NOTE - NS ED NURSE REASSESS COMMENT FT1
pt being transferred to [facility] ED. Report given to Anastasia SUTHERLAND
pt was indicated for sepsis, charting completing prior to transfer to ED [place].  only 1 bag of iv fluids currently running with abx, other order for fluids left open for continuation of care.
Pt presents to the ED BIBEMS from [facility] for further evaluation. Pt is alert and oriented x4.
Type and Screen

## 2025-04-09 NOTE — ED ADULT NURSE NOTE - NSFALLUNIVINTERV_ED_ALL_ED
Bed/Stretcher in lowest position, wheels locked, appropriate side rails in place/Call bell, personal items and telephone in reach/Instruct patient to call for assistance before getting out of bed/chair/stretcher/Non-slip footwear applied when patient is off stretcher/[place] to call system/Physically safe environment - no spills, clutter or unnecessary equipment/Purposeful proactive rounding/Room/bathroom lighting operational, light cord in reach

## 2025-04-09 NOTE — ED PROVIDER NOTE - OBJECTIVE STATEMENT
70 yo F, no chronic medical conditions, previous colonic polyp removed during routine colonoscopy, here for assessment of severe abdominal pain, distension now with constipation and obstipation.    Patient lives in [place], about  2 weeks ago developed gradually worsening pain and constipation. She was seen in hospital there, had CT abdomen and pelvis that demonstrated large bowel obstruction with fecal impaction, transition point at recto sigmoid junction. No apparent mass. Enema was attempted without improvement in fecal impaction. Plan was for resection and ostomy but patient's son who is RN here flew her here for eval.     No nausea, vomiting, fever or chills. Only abdominal surgery was tubal ligation.

## 2025-04-09 NOTE — H&P ADULT - NSHPLABSRESULTS_GEN_ALL_CORE
LAB/STUDIES:                        14.9   4.83  )-----------( 329      ( 09 Apr 2025 19:48 )             46.3     04-09    135  |  94[L]  |  28[H]  ----------------------------<  116[H]  5.8[H]   |  25  |  1.7[H]    Ca    10.2      09 Apr 2025 19:48    TPro  6.4  /  Alb  3.8  /  TBili  0.9  /  DBili  x   /  AST  27  /  ALT  20  /  AlkPhos  88  04-09      LIVER FUNCTIONS - ( 09 Apr 2025 19:48 )  Alb: 3.8 g/dL / Pro: 6.4 g/dL / ALK PHOS: 88 U/L / ALT: 20 U/L / AST: 27 U/L / GGT: x           Urinalysis Basic - ( 09 Apr 2025 19:48 )    Color: x / Appearance: x / SG: x / pH: x  Gluc: 116 mg/dL / Ketone: x  / Bili: x / Urobili: x   Blood: x / Protein: x / Nitrite: x   Leuk Esterase: x / RBC: x / WBC x   Sq Epi: x / Non Sq Epi: x / Bacteria: x          IMAGING:  < from: CT Abdomen and Pelvis w/ IV Cont (04.09.25 @ 20:13) >    Massive pneumoperitoneum with a smaller pocket of retroperitoneum   posterior to the pancreatic body. Findings suggest perforated   intraperitoneal viscus, possibly small bowel perforation.    Largest amount of complex ascites in the region of the lesser curvature   of the stomach with nearby gas suggesting the site of perforation is in   the region of the stomach/proximal bowel.    Fluid-filled distended colonic loops with a caliber change at the   rectosigmoid junction. Upstream gastric and small bowel dilatation. Focal   areas of small bowel wall thickening, likely reactive. Further evaluation   with colonoscopy is recommended when patient is clinically able.    Retroperitoneal adenopathy.    Bilateral adrenal hyperenhancement suspicious for element of shock

## 2025-04-09 NOTE — H&P ADULT - NSHPPHYSICALEXAM_GEN_ALL_CORE
VITALS:  T(F): 95.7 (04-09-25 @ 21:41), Max: 95.7 (04-09-25 @ 21:41)  HR: 98 (04-09-25 @ 20:59) (98 - 113)  BP: 112/56 (04-09-25 @ 20:59) (100/60 - 125/88)  RR: 18 (04-09-25 @ 20:59) (18 - 18)  SpO2: 99% (04-09-25 @ 20:59) (93% - 99%)    PHYSICAL EXAM:  General: NAD, confused  HEENT: NCAT, MIC, EOMI,  Cardiac: RRR S1, S2,  Respiratory: CTAB, normal respiratory effort, breath sounds equal BL  Abdomen: Soft, moderately distended, non-tender,   Skin: Warm/dry, normal color

## 2025-04-09 NOTE — ED PROVIDER NOTE - CLINICAL SUMMARY MEDICAL DECISION MAKING FREE TEXT BOX
Pt presented with abdominal pain, constipation. Found to have pneumoperitoneum. Surgical evaluation performed.   Patient stabilized.  Pt admitted.    Attending Statement: I have personally provided the amount of critical care time documented below excluding time spent on separate procedures.     Critical Care Time Spent (min) Must be 30 or more minutes to qualify: 35.

## 2025-04-09 NOTE — ED PROVIDER NOTE - ED STEMI HIDDEN
It is very important that you follow-up with [facility] and call them today to make sure that you have an appointment.  They are aware of your case.  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  We are wrapping your ear with a wound dressing if this dressing gets soiled or wet it needs to be changed.  If this wound gets dirty and has a high likelihood of becoming infected.  You need to try and keep this area as clean and dry as possible and try to keep it covered.  In discussion with the ENT physicians this wound will likely continue to bleed intermittently until you are seen and is treated.  If the bleeding is uncontrolled and will not stop you need to return to your nearest emergency department as soon as possible.  
hide

## 2025-04-09 NOTE — H&P ADULT - ASSESSMENT
ASSESSMENT:  69yF w/ PMHx of smoker, COPD  who presented with constipation and abdominal distention. At bedside examination, patient saturating 99% on 6L NC, abdomen moderately distended, nontender. In ED, CTAP reveals large pneumoperitoneum. Patient being brought to the OR for emergent ex lap, possible bowel resection, possible ostomy. Consent obtained by son.     PLAN:  - Admit to surgery   - Level 1 OR for ex lap, poss bowel resection, poss ostomy   - Pre op   - NPO, IVF   - Consent in chart  - 2U pRBC on hold for OR         Above plan discussed with Attending Surgeon Dr. Amaya , patient, patient family, and ED team  04-09-25 @ 21:51

## 2025-04-09 NOTE — H&P ADULT - HISTORY OF PRESENT ILLNESS
Patient: NICKY ACOSTA , 69y ([birth year])Female   MRN: [id number]  Location: [facility] ED  Visit: 04-09-25 Emergency  Date: 04-09-25 @ 21:51    HPI: 69 year old female with PMH of COPD, smoker, presents with abdominal distention and constipation. Patient is a poor historian. History obtained by son at bedside. As per son, patient has not had a bowel movement in 2 weeks. 2 days ago, patient experienced abdominal distention and abdominal cramping. Patient went to a hospital in [place], CTAP reveals large bowel obstruction, where patient received an enema for. No nausea or vomiting.   Last colonoscopy was 2 years ago, colonic poly removed. At bedside examination, patient saturating 99% on 6L NC, abdomen moderately distended, nontender. In ED, CTAP reveals large pneumoperitoneum. Patient being brought to the OR for emergent ex lap, possible bowel resection, possible ostomy. Consent obtained by son.

## 2025-04-09 NOTE — H&P ADULT - ATTENDING COMMENTS
This is 69 year old female with PMH of COPD, smoker, presents with abdominal distention and constipation. Patient is a poor historian. History obtained by son at bedside. As per son, patient has not had a bowel movement in 2 weeks. 2 days ago, patient experienced abdominal distention and abdominal cramping. Patient went to a hospital in [place], CTAP reveals large bowel obstruction, where patient received an enema for. No nausea or vomiting.   Last colonoscopy was 2 years ago, colonic poly removed. At bedside examination, patient saturating 99% on 6L NC, abdomen moderately distended, nontender. In ED, CTAP reveals large pneumoperitoneum. Patient being brought to the OR for emergent ex lap, possible bowel resection, possible ostomy. Consent obtained by son.     PE;  Patient looks confused, received Dilaudid  Chest: decreased breath sounds  CV : RRR  Abdomen: tender, positive rebound    I independently reviewed and interpreted all images and labs:  CT Abd/Pelvis: massive pneumoperitoneum.    ASSESSMENT:  70 y/o female with Perforated Viscus.  Pneumoperitoneum.  Generalized peritonitis.  COPD.  At risk for hemodynamic instability.    PLAN:  - NPO, IVF  - iv Zosyn  - DVT prophylaxis    Will book for emergent Exploratory Laparotomy, Possible Bowel Resection, Possible Ostomy and all indicated procedures.    Informed consent was obtained from the patient's son (next of kin) for the above procedure after explaining all the risks and benefits of it including but not limited to infection, bleeding and etc. He understood and agreed. All questions were answered.

## 2025-04-09 NOTE — ED PROVIDER NOTE - PROGRESS NOTE DETAILS
DiMare sepsis at 2000 -- fluid resuscitation increased, zosyn given, cultures sent DiMare: pneumoperitoneum noted on xray - Dr. Chávez aware in critical care area, accepts transfer. Patient's son Conrad who is RN in ED here is aware. Spoke with Surgical PRINCESS Del Rio in ED here, Surgical resident at [place]. Patient evaluated bedside, vital stable.  Right AC peripheral IV placed and labs drawn.  Patient admitted to surgery, will be taken to the OR, admitted to SDU.

## 2025-04-09 NOTE — ED PROVIDER NOTE - PHYSICAL EXAMINATION
VITAL SIGNS: I have reviewed nursing notes and confirm.  CONSTITUTIONAL: thin, has mild temporal wasting   SKIN: Skin exam is warm and dry, no acute rash.  HEAD: Normocephalic; atraumatic.  EYES: PERRL, EOM intact; conjunctiva and sclera clear.  ENT: No nasal discharge; airway clear. mucous membranes are dry  NECK: Supple; non tender.  CARD: tachy with regular rhythm  RESP: No wheezes, rales or rhonchi.  ABD: abdomen is distended, tympanic, diffusely tender  EXT: Normal ROM.   NEURO: Alert, oriented. Grossly unremarkable. No focal deficits.  PSYCH: Cooperative, appropriate.

## 2025-04-10 NOTE — PROCEDURAL SAFETY CHECKLIST WITH OR WITHOUT SEDATION - CONFIRM WITH TEAM MEMBERS ANY EQUIPMENT, INSTRUMENT AND/OR SUPPLY ISSUES THAT NEED TO BE ADDRESSED
Rickey Barrera contacted to FU on INR due 12/13/21.  RN states that pt is currently in the hospital but PT/INR was collected.   RN to fax results to [facility].    n/a

## 2025-04-10 NOTE — CONSULT NOTE ADULT - SUBJECTIVE AND OBJECTIVE BOX
NICKY ACOSTA   [id number]   [birth year]  69yF  ============================================================   DATE OF INITIAL SICU/SDU CONSULT: 04-10-25    INDICATION FOR SICU CONSULT: Perforated transverse colon with feculent peritonitis, s/p ex lap, extended right hemicolectomy, mucus fistula created, remains on vasopressors and intubated     SICU COURSE EVENTS :  04-10 - admitted to SICU service     24HOUR EVENTS  -Admission under SICU service    [X] A ten-point review of systems was negative except as expressed in note.  [X ] History was obtained from patient. If unable to participate in their care, history was from review of the chart and collateral sources of information.  ============================================================   HPI   69 year old female with PMH of COPD, smoker, presents with abdominal distention and constipation. Patient is a poor historian. History obtained by son at bedside. As per son, patient has not had a bowel movement in 2 weeks. 2 days ago, patient experienced abdominal distention and abdominal cramping. Patient went to a hospital in [place], CTAP reveals large bowel obstruction, where patient received an enema for. No nausea or vomiting.   Last colonoscopy was 2 years ago, colonic poly removed. At bedside examination, patient saturating 99% on 6L NC, abdomen moderately distended, nontender. In ED, CTAP reveals large pneumoperitoneum. Patient being brought to the OR for emergent ex lap, possible bowel resection, possible ostomy. Consent obtained by son.  (09 Apr 2025 21:50)    Pertinent Imaging    OR Stats for 04-10-25    OR Time : 4hrs  IV Fluids: 8L crystalloid  EBL: 100cc  Blood Products: 1u PRBC  UOP: 350cc via forrest    PAST MEDICAL & SURGICAL HISTORY  COPD, mild    Smoker      HOME MEDICATIONS      ACTIVE MEDICATIONS    iohexol 300 mG (iodine)/mL Oral Solution 30 milliLiter(s) Oral once  lactated ringers. 1000 milliLiter(s) IV Continuous <Continuous>  lactated ringers. 1000 milliLiter(s) IV Continuous <Continuous>  ondansetron Injectable 4 milliGRAM(s) IV Push once PRN Nausea and/or Vomiting  ondansetron Injectable 4 milliGRAM(s) IV Push once  pantoprazole  Injectable 40 milliGRAM(s) IV Push daily  piperacillin/tazobactam IVPB.. 3.375 Gram(s) IV Intermittent every 8 hours  sodium chloride 0.9% Bolus 1000 milliLiter(s) IV Bolus once    ALLERGIES  Allergies    No Known Allergies    Intolerances         VITAL SIGNS (Last 24Hours)  ICU Vital Signs Last 24 Hrs  T(C): 35.4 (09 Apr 2025 22:39), Max: 35.4 (09 Apr 2025 21:41)  T(F): 95.7 (09 Apr 2025 22:39), Max: 95.7 (09 Apr 2025 21:41)  HR: 106 (09 Apr 2025 22:39) (98 - 113)  BP: 167/71 (09 Apr 2025 22:39) (100/60 - 167/71)  BP(mean): --  ABP: --  ABP(mean): --  RR: 19 (09 Apr 2025 22:39) (18 - 19)  SpO2: 99% (09 Apr 2025 22:39) (93% - 99%)    O2 Parameters below as of 09 Apr 2025 22:39  Patient On (Oxygen Delivery Method): nasal cannula  O2 Flow (L/min): 4          INTAKE/OUTPUT (Last 24Hours)  I&O's Summary      LABS (Last 24Hours)  04-09 @ 19:48: Na 135 K 5.8 Cl 94 Mg ___ b>Phos ___ BUN/Cr 28/1.7>>>     04-09-25 @ 19:48  AST27   ALT20  DBili__  TBili0.9      MECHANICAL VENT/BLOOD GAS  if indicated         PHYSICAL EXAM   GCS:      Exam: A&Ox3, no focal deficits    RESPIRATORY:  Normal expansion/effort    CARDIOVASCULAR:   non tachycardic  No peripheral edema    GASTROINTESTINAL:  Abdomen soft, non-tender, non-distended    MUSCULOSKELETAL:  Extremities warm, pink, well-perfused.    DERM:  No skin breakdown     :   Exam: Forrest catheter in place.     ----------------------------------------------------------------------------------------------------------  Tubes/Lines/Drains    [x] Peripheral IV    [ ] Urinary Catheter Maciej  [ ]Present on Admission          Insertion Date:                                   [ ] Central Venous Line       [ ]IJ             [ ]Femoral     [ ]Subclavian   [ ]Left  [ ]Right      Insertion Date:          [ ] Arterial Line 	                [ ]Radial    [ ]Femoral     [ ]Axillary         [ ]Left  [ ]Right      Insertion Date:            NICKY ACOSTA   [id number]   [birth year]  69yF  ============================================================   DATE OF INITIAL SICU/SDU CONSULT: 04-10-25    INDICATION FOR SICU CONSULT: Perforated transverse colon with feculent peritonitis, s/p ex lap, extended right hemicolectomy, mucus fistula created, remains on vasopressors and intubated     SICU COURSE EVENTS :  04-10 - admitted to SICU service     24HOUR EVENTS  -Admission under SICU service    [X] A ten-point review of systems was negative except as expressed in note.  [X ] History was obtained from patient. If unable to participate in their care, history was from review of the chart and collateral sources of information.  ============================================================   HPI   69 year old female with PMH of COPD, smoker, presents with abdominal distention and constipation. Patient is a poor historian. History obtained by son at bedside. Patient had colonoscopy (2 yrs ago) and noted to have cancerous polyp, which was removed and had subsequent colonoscopy screenings. As per son, patient has not had a bowel movement in 2 weeks, and 2 days ago patient experienced abdominal distention and cramping. Patient went to a hospital in [place], CTAP reveals large bowel obstruction, where patient received an enema for. No nausea or vomiting. Patient came to [facility] ED for evaluation.     < from: CT Abdomen and Pelvis w/ IV Cont (04.09.25 @ 20:13) >  Massive pneumoperitoneum with a smaller pocket of retroperitoneum   posterior to the pancreatic body. Findings suggest perforated   intraperitoneal viscus, possibly small bowel perforation.  Largest amount of complex ascites in the region of the lesser curvature   of the stomach with nearby gas suggesting the site of perforation is in   the region of the stomach/proximal bowel.  Fluid-filled distended colonic loops with a caliber change at the   rectosigmoid junction. Upstream gastric and small bowel dilatation. Focal   areas of small bowel wall thickening, likely reactive. Further evaluation   with colonoscopy is recommended when patient is clinically able.  Retroperitoneal adenopathy.  Bilateral adrenal hyperenhancement suspicious for element of shock    Patient taken as level one for large pneumoperitoneum. Perforated transverse colon, created extended right alexis    OR Stats for 04-10-25    OR Time : 4hrs  IV Fluids: 8L crystalloid  EBL: 100cc  Blood Products: 1u PRBC  UOP: 350cc via forrest    PAST MEDICAL & SURGICAL HISTORY  COPD, mild    Smoker      HOME MEDICATIONS      ACTIVE MEDICATIONS    iohexol 300 mG (iodine)/mL Oral Solution 30 milliLiter(s) Oral once  lactated ringers. 1000 milliLiter(s) IV Continuous <Continuous>  lactated ringers. 1000 milliLiter(s) IV Continuous <Continuous>  ondansetron Injectable 4 milliGRAM(s) IV Push once PRN Nausea and/or Vomiting  ondansetron Injectable 4 milliGRAM(s) IV Push once  pantoprazole  Injectable 40 milliGRAM(s) IV Push daily  piperacillin/tazobactam IVPB.. 3.375 Gram(s) IV Intermittent every 8 hours  sodium chloride 0.9% Bolus 1000 milliLiter(s) IV Bolus once    ALLERGIES  Allergies    No Known Allergies    Intolerances         VITAL SIGNS (Last 24Hours)  ICU Vital Signs Last 24 Hrs  T(C): 35.4 (09 Apr 2025 22:39), Max: 35.4 (09 Apr 2025 21:41)  T(F): 95.7 (09 Apr 2025 22:39), Max: 95.7 (09 Apr 2025 21:41)  HR: 106 (09 Apr 2025 22:39) (98 - 113)  BP: 167/71 (09 Apr 2025 22:39) (100/60 - 167/71)  BP(mean): --  ABP: --  ABP(mean): --  RR: 19 (09 Apr 2025 22:39) (18 - 19)  SpO2: 99% (09 Apr 2025 22:39) (93% - 99%)    O2 Parameters below as of 09 Apr 2025 22:39  Patient On (Oxygen Delivery Method): nasal cannula  O2 Flow (L/min): 4          INTAKE/OUTPUT (Last 24Hours)  I&O's Summary      LABS (Last 24Hours)  04-09 @ 19:48: Na 135 K 5.8 Cl 94 Mg ___ b>Phos ___ BUN/Cr 28/1.7>>>     04-09-25 @ 19:48  AST27   ALT20  DBili__  TBili0.9      MECHANICAL VENT/BLOOD GAS  if indicated         PHYSICAL EXAM   GCS:      Exam: A&Ox3, no focal deficits    RESPIRATORY:  Normal expansion/effort    CARDIOVASCULAR:   non tachycardic  No peripheral edema    GASTROINTESTINAL:  Abdomen soft, non-tender, non-distended    MUSCULOSKELETAL:  Extremities warm, pink, well-perfused.    DERM:  No skin breakdown     :   Exam: Forrest catheter in place.     ----------------------------------------------------------------------------------------------------------  Tubes/Lines/Drains    [x] Peripheral IV    [ ] Urinary Catheter Forrest  [ ]Present on Admission          Insertion Date:                                   [ ] Central Venous Line       [ ]IJ             [ ]Femoral     [ ]Subclavian   [ ]Left  [ ]Right      Insertion Date:          [ ] Arterial Line 	                [ ]Radial    [ ]Femoral     [ ]Axillary         [ ]Left  [ ]Right      Insertion Date:            NICKY ACOSTA   [id number]   [birth year]  69yF  ============================================================   DATE OF INITIAL SICU/SDU CONSULT: 04-10-25    INDICATION FOR SICU CONSULT: Perforated transverse colon with feculent peritonitis, s/p ex lap, extended right hemicolectomy, mucus fistula created, remains on vasopressors and intubated     SICU COURSE EVENTS :  04-10 - admitted to SICU service     24HOUR EVENTS  -Admission under SICU service    [X] A ten-point review of systems was negative except as expressed in note.  [X ] History was obtained from patient. If unable to participate in their care, history was from review of the chart and collateral sources of information.  ============================================================   HPI   69 year old female with PMH of COPD, smoker, presents with abdominal distention and constipation. Patient is a poor historian. History obtained by son at bedside. Patient had colonoscopy (2 yrs ago) and noted to have cancerous polyp, which was removed and had subsequent colonoscopy screenings. As per son, patient has not had a bowel movement in 2 weeks, and 2 days ago patient experienced abdominal distention and cramping. Patient went to a hospital in [place], CTAP reveals large bowel obstruction, where patient received an enema for. No nausea or vomiting. Patient came to [facility] ED for evaluation.     < from: CT Abdomen and Pelvis w/ IV Cont (04.09.25 @ 20:13) >  Massive pneumoperitoneum with a smaller pocket of retroperitoneum   posterior to the pancreatic body. Findings suggest perforated   intraperitoneal viscus, possibly small bowel perforation.  Largest amount of complex ascites in the region of the lesser curvature   of the stomach with nearby gas suggesting the site of perforation is in   the region of the stomach/proximal bowel.  Fluid-filled distended colonic loops with a caliber change at the   rectosigmoid junction. Upstream gastric and small bowel dilatation. Focal   areas of small bowel wall thickening, likely reactive. Further evaluation   with colonoscopy is recommended when patient is clinically able.  Retroperitoneal adenopathy.  Bilateral adrenal hyperenhancement suspicious for element of shock    Patient taken as level one for large pneumoperitoneum. Perforated transverse colon, created extended right hemicolectomy and mucus fistula. Patient evaluated in pacu, intubated and sedated on levophed and vasopressin infusions. Abdomen firm, distended, midline dressing in place, b/l ostomy with no outputs, LUQ drains with serosanguinous output.     OR Stats for 04-10-25    OR Time : 4hrs  IV Fluids: 8L crystalloid  EBL: 100cc  Blood Products: 1u PRBC  UOP: 350cc via forrest  - 2g cefotetan  - 1amp bicarb and dextrose  - 1g calcium chloride    PAST MEDICAL & SURGICAL HISTORY  COPD, mild  Smoker    HOME MEDICATIONS      ACTIVE MEDICATIONS    iohexol 300 mG (iodine)/mL Oral Solution 30 milliLiter(s) Oral once  lactated ringers. 1000 milliLiter(s) IV Continuous <Continuous>  lactated ringers. 1000 milliLiter(s) IV Continuous <Continuous>  ondansetron Injectable 4 milliGRAM(s) IV Push once PRN Nausea and/or Vomiting  ondansetron Injectable 4 milliGRAM(s) IV Push once  pantoprazole  Injectable 40 milliGRAM(s) IV Push daily  piperacillin/tazobactam IVPB.. 3.375 Gram(s) IV Intermittent every 8 hours  sodium chloride 0.9% Bolus 1000 milliLiter(s) IV Bolus once    ALLERGIES  No Known Allergies    VITAL SIGNS (Last 24Hours)  ICU Vital Signs Last 24 Hrs  T(C): 35.4 (09 Apr 2025 22:39), Max: 35.4 (09 Apr 2025 21:41)  T(F): 95.7 (09 Apr 2025 22:39), Max: 95.7 (09 Apr 2025 21:41)  HR: 106 (09 Apr 2025 22:39) (98 - 113)  BP: 167/71 (09 Apr 2025 22:39) (100/60 - 167/71)  BP(mean): --  ABP: --  ABP(mean): --  RR: 19 (09 Apr 2025 22:39) (18 - 19)  SpO2: 99% (09 Apr 2025 22:39) (93% - 99%)    O2 Parameters below as of 09 Apr 2025 22:39  Patient On (Oxygen Delivery Method): nasal cannula  O2 Flow (L/min): 4      INTAKE/OUTPUT (Last 24Hours)  I&O's Summary      LABS (Last 24Hours)  04-09 @ 19:48: Na 135 K 5.8 Cl 94 Mg ___ b>Phos ___ BUN/Cr 28/1.7>>>     04-09-25 @ 19:48  AST27   ALT20  DBili__  TBili0.9      PHYSICAL EXAM   GCS: 3T     RESPIRATORY:  Normal expansion/effort  b/l rhonchi    CARDIOVASCULAR:   non tachycardic  No peripheral edema    GASTROINTESTINAL:  Abdomen firm, distended, midline dressing in place, b/l ostomy with no outputs, LUQ drains with serosanguinous output.     MUSCULOSKELETAL:  Extremities warm, pink, well-perfused.    DERM:  No skin breakdown     :   Exam: Forrest catheter in place.     ----------------------------------------------------------------------------------------------------------  Tubes/Lines/Drains    [x] Peripheral IV    [x ] Urinary Catheter Forrest  [ ]Present on Admission          Insertion Date:                                   [x ] Central Venous Line       [ x]IJ             [ ]Femoral     [ ]Subclavian   [ ]Left  [ x]Right      Insertion Date:          [x ] Arterial Line 	                [x ]Radial    [ ]Femoral     [ ]Axillary         [x ]Left  [ ]Right      Insertion Date:

## 2025-04-10 NOTE — CHART NOTE - NSCHARTNOTEFT_GEN_A_CORE
POST-OP CHECK    PROCEDURE: S/P EX LAP WITH R HEMICOLECTOMY AND MUCUS FISTULA CREATION    S: Patient intubated, ventilated at 40/5. Tachy to , R femoral A-line placed this AM and functioning. Norepinephrine at 0.20, vasopressin at 0.03. NG tube in place with brown output. R mucus fistula, L ostomy with small amount of stool and dark blood. Midline dressing, some SS fluid on inferior aspect. L RODRIGO drain in place with SS fluid.      O:   T(C): 37.1 (04-10-25 @ 08:00), Max: 37.1 (04-10-25 @ 08:00)  HR: 84 (04-10-25 @ 08:19) (84 - 123)  BP: 88/52 (04-10-25 @ 08:00) (88/52 - 88/52)  RR: 20 (04-10-25 @ 07:30) (20 - 20)  SpO2: 99% (04-10-25 @ 08:19) (99% - 99%)  I&O's Summary    09 Apr 2025 07:01  -  10 Apr 2025 07:00  --------------------------------------------------------  IN: 345.5 mL / OUT: 450 mL / NET: -104.5 mL    10 Apr 2025 07:01  -  10 Apr 2025 10:33  --------------------------------------------------------  IN: 100 mL / OUT: 100 mL / NET: 0 mL      Diet, NPO      PHYSICAL EXAM:  General: Intubated, sedated  Cardiac: Tachycardic, regular rhythm  Respiratory: Ventilated 40/5  Abdomen: Soft, moderately distended. Abdominal dressing with SS fluid at lower aspect. R mucus fistula, L ostomy with stool and dark sanguineous fluid. RODRIGO drain in place with SS fluid.  Skin: Warm/dry, normal color    MEDICATIONS  (STANDING):  albumin human  5% IVPB 250 milliLiter(s) IV Intermittent every 6 hours  chlorhexidine 2% Cloths 1 Application(s) Topical <User Schedule>  dexMEDEtomidine Infusion 0.2 MICROgram(s)/kG/Hr (2.04 mL/Hr) IV Continuous <Continuous>  heparin   Injectable 5000 Unit(s) SubCutaneous every 8 hours  iohexol 300 mG (iodine)/mL Oral Solution 30 milliLiter(s) Oral once  lactated ringers Bolus 500 milliLiter(s) IV Bolus once  lactated ringers. 1000 milliLiter(s) (100 mL/Hr) IV Continuous <Continuous>  norepinephrine Infusion 0.05 MICROgram(s)/kG/Min (1.91 mL/Hr) IV Continuous <Continuous>  pantoprazole  Injectable 40 milliGRAM(s) IV Push daily  piperacillin/tazobactam IVPB.. 3.375 Gram(s) IV Intermittent every 8 hours  sodium bicarbonate  Infusion 0.331 mEq/kG/Hr (90 mL/Hr) IV Continuous <Continuous>  vasopressin Infusion. 0.03 Unit(s)/Min (4.5 mL/Hr) IV Continuous <Continuous>    MEDICATIONS  (PRN):  fentaNYL    Injectable 12.5 MICROGram(s) IV Push every 3 hours PRN Severe Pain (7 - 10)  ondansetron Injectable 4 milliGRAM(s) IV Push once PRN Nausea and/or Vomiting  sodium chloride 0.9% lock flush 10 milliLiter(s) IV Push every 1 hour PRN Pre/post blood products, medications, blood draw, and to maintain line patency      LABS:                        12.5   2.65  )-----------( 156      ( 10 Apr 2025 05:17 )             38.2     04-10    137  |  110  |  20  ----------------------------<  95  5.6[H]   |  21  |  1.1    Ca    7.6[L]      10 Apr 2025 05:17  Phos  3.3     04-10  Mg     2.2     04-10    TPro  2.3[L]  /  Alb  1.5[L]  /  TBili  1.0  /  DBili  0.7[H]  /  AST  83[H]  /  ALT  51[H]  /  AlkPhos  75  04-10    LIVER FUNCTIONS - ( 10 Apr 2025 05:17 )  Alb: 1.5 g/dL / Pro: 2.3 g/dL / ALK PHOS: 75 U/L / ALT: 51 U/L / AST: 83 U/L / GGT: x           PT/INR - ( 10 Apr 2025 05:17 )   PT: 20.00 sec;   INR: 1.68 ratio         PTT - ( 10 Apr 2025 05:17 )  PTT:52.5 sec    ASSESSMENT/PLAN:  69y y/o Female s/p ex lap with right hemicolectomy and mucus fistula creation.    PLAN:  - NPO w/ IVF  - NG tube  - Zosyn  - Protonix  - Monitor respiratory status  - Wean pressors as tolerated  - Management per SICU    SPECTRA: [id number]

## 2025-04-10 NOTE — CONSULT NOTE ADULT - ASSESSMENT
69 year old female with PMH of COPD, smoker, presented 4/9  with abdominal distention and constipation.  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 and hyperK  UOP seems to have picked up in past 1-2 hours 30 ml/hr  Hopefuly sign of recovery, now that necrotic gut removed and washed out, lactic acid will stabilize    Suggest  - Repeat BMP/VBG in hours or so, if pH improving w/ bicabr lactate stable K better can hold off RRT  - If still hyperK or worsening acidemia in spite of IV bicarb will need RRT (CVVH given  LEvo_vaso) will need  U dall placed if this is case   - for now cont IV bicarb, give Lokelma 10 mg for hyperK   - can send UA and Urine Na, though unlikely to change above a/p       Disucssed w/ SICU team    will follow  call w/ any questions updates/concerns either via teams or [phone]

## 2025-04-10 NOTE — CONSULT NOTE ADULT - SUBJECTIVE AND OBJECTIVE BOX
NEPHROLOGY CONSULTATION NOTE  69 year old female with PMH of COPD, smoker, presented 4/9  with abdominal distention and constipation. Her son had reported that  patient has not had a bowel movement in >  2 weeks. , patient experienced abdominal distention and abdominal cramping. Patient went to a hospital in [place], CTAP reveals large bowel obstruction, where patient received an enema for. No nausea or vomiting.   Last colonoscopy was 2 years ago, colonic poly removed. At bedside examination, patient saturating 99% on 6L NC, abdomen moderately distended, nontender. In ED, CTAP reveals large pneumoperitoneum. Went  to the OR for emergent ex lap, possible bowel resection, possible ostomy. Consent obtained by son.       found to have Perforation of proximal transverse colon with feculent peritonitis Cecum distended with patchy gangrene. There was also a non-traumatic large, non-expanding hematoma encasing the middle colic artery. a large dense mass at the rectosigmoid junction  \Underwent  extended right hemicolectomy   Post op Course complicated by severe hyperK , and acidemia      PAST MEDICAL & SURGICAL HISTORY:  COPD, mild      Smoker      No significant past surgical history        Allergies:  No Known Allergies    Home Medications Reviewed  Hospital Medications:   MEDICATIONS  (STANDING):  albumin human  5% IVPB 250 milliLiter(s) IV Intermittent every 6 hours  chlorhexidine 2% Cloths 1 Application(s) Topical <User Schedule>  dexMEDEtomidine Infusion 0.2 MICROgram(s)/kG/Hr (2.04 mL/Hr) IV Continuous <Continuous>  dextrose 50% Injectable 50 milliLiter(s) IV Push once  heparin   Injectable 5000 Unit(s) SubCutaneous every 8 hours  insulin regular  human recombinant 10 Unit(s) IV Push once  iohexol 300 mG (iodine)/mL Oral Solution 30 milliLiter(s) Oral once  lactated ringers Bolus 500 milliLiter(s) IV Bolus once  norepinephrine Infusion 0.05 MICROgram(s)/kG/Min (1.91 mL/Hr) IV Continuous <Continuous>  pantoprazole  Injectable 40 milliGRAM(s) IV Push daily  piperacillin/tazobactam IVPB.. 3.375 Gram(s) IV Intermittent every 8 hours  sodium bicarbonate  Infusion 0.331 mEq/kG/Hr (90 mL/Hr) IV Continuous <Continuous>  vasopressin Infusion. 0.03 Unit(s)/Min (4.5 mL/Hr) IV Continuous <Continuous>      SOCIAL HISTORY:  Denies ETOH,Smoking,   FAMILY HISTORY:        REVIEW OF SYSTEMS:  unable to obtain    VITALS:  T(F): 98.8 (04-10-25 @ 08:00), Max: 98.8 (04-10-25 @ 08:00)  HR: 123 (04-10-25 @ 10:00)  BP: 88/52 (04-10-25 @ 08:00)  RR: 20 (04-10-25 @ 10:00)  SpO2: 99% (04-10-25 @ 08:19)    04-09 @ 07:01  -  04-10 @ 07:00  --------------------------------------------------------  IN: 345.5 mL / OUT: 450 mL / NET: -104.5 mL    04-10 @ 07:01  -  04-10 @ 13:17  --------------------------------------------------------  IN: 1419.5 mL / OUT: 230 mL / NET: 1189.5 mL        Weight (kg): 40.8 (04-09 @ 22:09)    04-09-25 @ 07:01  -  04-10-25 @ 07:00  --------------------------------------------------------  IN: 0 mL / OUT: 70 mL / NET: -70 mL    04-10-25 @ 07:01  -  04-10-25 @ 13:17  --------------------------------------------------------  IN: 0 mL / OUT: 130 mL / NET: -130 mL      I&O's Detail    09 Apr 2025 07:01  -  10 Apr 2025 07:00  --------------------------------------------------------  IN:    Lactated Ringers: 225 mL    Norepinephrine: 102.5 mL    Vasopressin (Organ Donation): 18 mL  Total IN: 345.5 mL    OUT:    Bulb (mL): 330 mL    Colostomy (mL): 0 mL    Ileostomy (mL): 0 mL    Indwelling Catheter - Urethral (mL): 70 mL    Nasogastric/Oral tube (mL): 50 mL  Total OUT: 450 mL    Total NET: -104.5 mL      10 Apr 2025 07:01  -  10 Apr 2025 13:17  --------------------------------------------------------  IN:    Lactated Ringers: 400 mL    Lactated Ringers Bolus: 1000 mL    Vasopressin (Organ Donation): 6 mL    Vasopressin (Organ Donation): 13.5 mL  Total IN: 1419.5 mL    OUT:    Bulb (mL): 100 mL    Indwelling Catheter - Urethral (mL): 130 mL  Total OUT: 230 mL    Total NET: 1189.5 mL            PHYSICAL EXAM:  intubated  sedated  + forrest     LABS:  04-10    137  |  112[H]  |  23[H]  ----------------------------<  68[L]  7.4[HH]   |  15[L]  |  1.3    Ca    7.4[L]      10 Apr 2025 11:05  Phos  3.3     04-10  Mg     2.2     04-10    TPro  2.3[L]  /  Alb  1.5[L]  /  TBili  1.0  /  DBili  0.7[H]  /  AST  83[H]  /  ALT  51[H]  /  AlkPhos  75  04-10    Creatinine Trend: 1.3 <--, 1.1 <--, 1.7 <--                        12.5   2.65  )-----------( 156      ( 10 Apr 2025 05:17 )             38.2     Urine Studies:  Urinalysis Basic - ( 10 Apr 2025 11:05 )    Color:  / Appearance:  / SG:  / pH:   Gluc: 68 mg/dL / Ketone:   / Bili:  / Urobili:    Blood:  / Protein:  / Nitrite:    Leuk Esterase:  / RBC:  / WBC    Sq Epi:  / Non Sq Epi:  / Bacteria:                 RADIOLOGY & ADDITIONAL STUDIES:

## 2025-04-10 NOTE — DISCHARGE NOTE FOR THE EXPIRED PATIENT - HOSPITAL COURSE
69 year old female with PMH of COPD, smoker, presents with abdominal distention and constipation. Patient is a poor historian. History obtained by son at bedside. Patient had colonoscopy (2 yrs ago) and noted to have cancerous polyp, which was removed and had subsequent colonoscopy screenings. As per son, patient has not had a bowel movement in 2 weeks, and 2 days ago patient experienced abdominal distention and cramping. Patient went to a hospital in [place], CTAP reveals large bowel obstruction, where patient received an enema for. No nausea or vomiting. Patient came to [facility] ED for evaluation. CT showed Massive pneumoperitoneum with a smaller pocket of retroperitoneum posterior to the pancreatic body. Findings suggest perforated intraperitoneal viscus, possibly small bowel perforation.    Patient taken as level one for large pneumoperitoneum. Perforated transverse colon, created extended right hemicolectomy and mucus fistula. Patient evaluated in pacu, intubated and sedated on levophed and vasopressin infusions. Abdomen firm, distended, midline dressing in place, b/l ostomy with no outputs, LUQ drains with serosanguinous output. POD 0, patient was acidotic, started on bicarb gtt, hyperkalemic, refractory to treatment, nephrology consulted and UDALL placed for HD. Patient had no mental status, pupils unresponsive, and CT head was ordered. Pt was also on levophed/vasopressin, received total 1.5L additional IVF and albumin boluses. Levophed, weaned off, then patient became acutely unstable and was in asystole, patient became acutely hypotensive, levophed requirement increasing, CT postponned. Pt went into asystole, CODE blue called, compressions initiated, Rosc achieved total 2 times for approx 5-10sec, with return to asystole. total Epinephrine 8, bicarb 3amps, 2gm calcium, 4 amp D50. Total code lasted 20 minutes. Son Kev Hernandez, decided to no further escalation of care, time of death 4:40pm, Dr Montiel bedside, SICU attending Dr Amaya notified.  69 year old female with PMH of COPD, smoker, presents with abdominal distention and constipation. Patient is a poor historian. History obtained by son at bedside. Patient had colonoscopy (2 yrs ago) and noted to have cancerous polyp, which was removed and had subsequent colonoscopy screenings. As per son, patient has not had a bowel movement in 2 weeks, and 2 days ago patient experienced abdominal distention and cramping. Patient went to a hospital in [place], CTAP reveals large bowel obstruction, where patient received an enema for. No nausea or vomiting. Patient came to [facility] ED for evaluation. CT showed Massive pneumoperitoneum with a smaller pocket of retroperitoneum posterior to the pancreatic body. Findings suggest perforated intraperitoneal viscus, possibly small bowel perforation.    Patient taken as level one for large pneumoperitoneum. Perforated transverse colon, created extended right hemicolectomy and mucus fistula. Patient evaluated in pacu, intubated and sedated on levophed and vasopressin infusions. Abdomen firm, distended, midline dressing in place, b/l ostomy with no outputs, LUQ drains with serosanguinous output. POD 0, patient was acidotic, started on bicarb gtt, hyperkalemic, refractory to treatment, nephrology consulted and UDALL placed for HD. Patient had no mental status, pupils unresponsive, and CT head was ordered. Pt was also on levophed/vasopressin, received total 1.5L additional IVF and albumin boluses. Levophed, weaned off, then patient became acutely unstable and was in asystole, patient became acutely hypotensive, levophed requirement increasing, CT postponed. Pt went into asystole, CODE blue called, compressions initiated, Rosc achieved total 2 times for approx 5-10sec, with return to asystole. total Epinephrine 8, bicarb 3amps, 2gm calcium, 4 amp D50, 10u insulin. Total code lasted 20 minutes. Son Kev Hernandez, decided to no further escalation of care, time of death 4:40pm, Dr Montiel bedside, SICU attending Dr Amaya notified.

## 2025-04-10 NOTE — BRIEF OPERATIVE NOTE - FIRST ASSIST NAME
Liver ultrasound with right lobe lesion measuring 2.5 x 3.5 x 2 cm hypoechoic heterogeneous.  I will order MRI of the liver.  Left a message for the patient to call my office  
Vishnu Carty (Resident)

## 2025-04-10 NOTE — BRIEF OPERATIVE NOTE - NSICDXBRIEFPROCEDURE_GEN_ALL_CORE_FT
PROCEDURES:  Exploratory laparotomy with ileocolectomy, washout of abdominal cavity, and creation of ileostomy 10-Apr-2025 04:44:39  Vishnu Carty  Lysis of intestinal adhesions 10-Apr-2025 04:44:53  Vishnu Carty  Colostomy 10-Apr-2025 04:45:56  Vishnu Carty

## 2025-04-10 NOTE — CHART NOTE - NSCHARTNOTEFT_GEN_A_CORE
ANESTHESIA to PACU NOTE      __x__ Intubated  TV: 300     Rate: 20     FiO2: 100%  ____ Patent Airway    ____ Full return of protective reflexes    ____ Full recovery from anesthesia / sedation to baseline status    Vitals:    /75  RR 20-vent  O2sat. 100%  Temp: 36.7C      Mental Status:  ____ Awake   _____ Alert   _____ Drowsy   ___x__ Sedated    Nausea/Vomiting: ____ Yes, See Post - Op Orders      __x__ No    Pain Scale (0-10): _____    Treatment: x____ None    ____ See Post - Op/PCA Orders    Post - Operative Fluids:   ____ Oral   __x__ See Post - Op Orders    Plan:  Discharge to:   ____Home       _____Floor      __x___Critical Care    _____ Other:_________________    Comments: s/p general anesthesia with ETT. PT remains intubated post procedure. Transferred to PACU fully monitored and bag-ventilated at 100% FiO2. Vaso and Levo drips ongoing. No anesthesia complications. Pt's condition critical, but stable at this time. Full report is given to PACU RN and SICU PA at the bedside.

## 2025-04-10 NOTE — CONSULT NOTE ADULT - ASSESSMENT
Assessment & Plan  69y Female Perforated transverse colon with feculent peritonitis, s/p ex lap, extended right hemicolectomy, mucus fistula created, remains on vasopressors and intubated    NEURO:  #Acute pain    -APAP prn, Gabapentin 100mg q8    -if intubated Fentanyl 12.5 mcg q4 prn    -Inpatient Rx: ondansetron Injectable 4 milliGRAM(s) IV Push once PRN  ondansetron Injectable 4 milliGRAM(s) IV Push once      RESP:   #Oxygenation    -wean off NC to RA as tolerated  #Activity    -increase as tolerated  #COPD  #Smoker    CARDS:   #  Imaging:   Labs:   Rx-    GI/NUTR:   #Perforated transverse colon with feculent peritonitis, s/p exploratory laparotomy, extended right hemicolectomy, mucus fistula created,   #Diet, NPO    -aspiration precautions, HOB 30  #GI Prophylaxis    -not indicated  #Bowel regimen    -senna/miralax    -last bowel movement      /RENAL:   #urine output in crtically ill    [04-09 @ 19:48] BUN/Cr  28/1.7  -->  [04-09 @ 19:48]Na  135 // K  5.8 // Mg  -- // Phos  --    #maintain euvolemia                     HEME/ONC:     Labs: Hb/Hct:  14.9/46.3  (04-09 @ 19:48)  -->                      Plts:  329  -->                 PTT/INR:  27.0/1.29  --->       Blood Consent-obtain if acute anemia, q6 CBC    ID:  #monitor for leukocytosis/fever  WBC- 4.83  --->>  Temp trend- 24hrs T(F): 95.7 (04-09 @ 22:39), Max: 95.7 (04-09 @ 21:41)  Current antibiotics-piperacillin/tazobactam IVPB.. 3.375 every 8 hours      #MRSA swab    ENDO:    -FSG q6 if NPO or Tube feeds    -Glucose goal 140-180    -if above 180 start ISS     HA1C     MSK:     Activity - Ambulate as Tolerated:     Time/Priority:  Routine (04-09-25 @ 22:12)      SKIN:  #DTI screening    - sacral injury assessment    HOME Medications:      LINES/DRAINS:  PIV, Wing    ADVANCED DIRECTIVES:  Full Code    HCP/Emergency Contact-    INDICATION FOR SICU/SDU: Sepsis             DISPO:   Case discussed with attending   Assessment & Plan  69y Female Perforated transverse colon with feculent peritonitis, s/p ex lap, extended right hemicolectomy, mucus fistula created, remains on vasopressors and intubated    NEURO:  #Sedation    - precedex  #Acute pain    -fentanyl IVP prn    RESP:   #Mechanical ventilation    -350/18/100/5  #Activity    -increase as tolerated  #COPD  #Smoker    CARDS:   #Acute hypotension secondary to perforated colon    - levophed    - vasopressin    GI/NUTR:   #Perforated transverse colon with feculent peritonitis, s/p exploratory laparotomy, extended right hemicolectomy, mucus fistula created,    - RODRIGO drain in pelvis - serosangunious  #Diet, NPO    - NGT in place 58cm at nares (placed in OR)    -aspiration precautions, HOB 30  #GI Prophylaxis    -not in IV pepcid qd  #Bowel regimen    -none    -last bowel movement  prior to admission    /RENAL:   #urine output in critically ill    -indwelling forrest (placed 4/10)     Labs:          BUN/Cr- 28/1.7  -->,  20/1.1  -->          [04-10 @ 05:17]Na  137 // K  5.6 // Mg  2.2 // Phos  3.3  [04-09 @ 19:48]Na  135 // K  5.8 // Mg  -- // Phos  --  #Maintain euvolemia    - LR @ 100cc/hr       HEME/ONC:   #DVT prophylaxis    - HOLDING    -, SCDs    Labs: Hb/Hct:  14.9/46.3  -->,  12.5/38.2  -->                      Plts:  329  -->,  156  -->                 PTT/INR:  27.0/1.29  --->,  52.5/1.68  --->       Home Rx:   T&S Expires: 4/12    ID:  #monitor for leukocytosis/fever  WBC- 4.83  --->>  Temp trend- 24hrs T(F): 95.7 (04-09 @ 22:39), Max: 95.7 (04-09 @ 21:41)  Current antibiotics    -piperacillin/tazobactam IVPB.. 3.375 every 8 hours  #MRSA pending    ENDO:    -FSG q6 while NPO    -Glucose goal 140-180    -if above 180 start ISS     HA1C     MSK:     Activity - increase as tolerated    SKIN:  #DTI screening    - sacral injury assessment    LINES/DRAINS:  PIV, [place], RIJ TLC, left radial A line    ADVANCED DIRECTIVES:  Full Code    HCP/Emergency Contact-    INDICATION FOR SICU/SDU: Sepsis    DISPO:   Case discussed with attending Dr. Amaya

## 2025-04-10 NOTE — BRIEF OPERATIVE NOTE - NSICDXBRIEFPOSTOP_GEN_ALL_CORE_FT
POST-OP DIAGNOSIS:  Perforation of transverse colon 10-Apr-2025 04:49:49  Vishnu Carty  Peritonitis 10-Apr-2025 04:49:57  Vishnu Carty  Colonic mass 10-Apr-2025 04:50:07  Vishnu Carty

## 2025-04-10 NOTE — PROGRESS NOTE ADULT - SUBJECTIVE AND OBJECTIVE BOX
SURGERY PROGRESS NOTE    24 HR EVENTS: Patient s/p ex lap with right hemicolectomy and mucus fistula creation this morning (4/10). Patient intubated, ventilated at 40/5. Tachy to , R femoral A-line placed this AM in post-op area and currently functioning. Norepinephrine at 0.20, vasopressin at 0.03. NG tube in place with brown output. R mucus fistula, L ostomy with small amount of stool and dark blood. Midline dressing, some SS fluid on inferior aspect. L RODRIGO drain in place with SS fluid.      OBJECTIVE:  Vital Signs Last 24 Hrs  T(C): 37.1 (10 Apr 2025 08:00), Max: 37.1 (10 Apr 2025 08:00)  T(F): 98.8 (10 Apr 2025 08:00), Max: 98.8 (10 Apr 2025 08:00)  HR: 123 (10 Apr 2025 10:00) (84 - 124)  BP: 88/52 (10 Apr 2025 08:00) (74/30 - 167/71)  BP(mean): 66 (10 Apr 2025 08:00) (66 - 66)  RR: 20 (10 Apr 2025 10:00) (18 - 22)  SpO2: 99% (10 Apr 2025 08:19) (93% - 100%)    Parameters below as of 10 Apr 2025 10:00  Patient On (Oxygen Delivery Method): ventilator        I&O's Summary    09 Apr 2025 07:01  -  10 Apr 2025 07:00  --------------------------------------------------------  IN: 345.5 mL / OUT: 450 mL / NET: -104.5 mL    10 Apr 2025 07:01  -  10 Apr 2025 11:47  --------------------------------------------------------  IN: 1419.5 mL / OUT: 230 mL / NET: 1189.5 mL        PHYSICAL EXAM:  General: Intubated, sedated  Cardiac: Tachycardic, regular rhythm  Respiratory: Ventilated 40/5  Abdomen: Soft, moderately distended. Abdominal dressing with SS fluid at lower aspect. R mucus fistula, L ostomy with stool and dark sanguineous fluid. RODRIGO drain in place with SS fluid.  Skin: Warm/dry, normal color    LABS:                        12.5   2.65  )-----------( 156      ( 10 Apr 2025 05:17 )             38.2     04-10    137  |  110  |  20  ----------------------------<  95  5.6[H]   |  21  |  1.1    Ca    7.6[L]      10 Apr 2025 05:17  Phos  3.3     04-10  Mg     2.2     04-10    TPro  2.3[L]  /  Alb  1.5[L]  /  TBili  1.0  /  DBili  0.7[H]  /  AST  83[H]  /  ALT  51[H]  /  AlkPhos  75  04-10    PT/INR - ( 10 Apr 2025 05:17 )   PT: 20.00 sec;   INR: 1.68 ratio         PTT - ( 10 Apr 2025 05:17 )  PTT:52.5 sec  Urinalysis Basic - ( 10 Apr 2025 05:17 )    Color: x / Appearance: x / SG: x / pH: x  Gluc: 95 mg/dL / Ketone: x  / Bili: x / Urobili: x   Blood: x / Protein: x / Nitrite: x   Leuk Esterase: x / RBC: x / WBC x   Sq Epi: x / Non Sq Epi: x / Bacteria: x        RADIOLOGY & ADDITIONAL STUDIES:

## 2025-04-10 NOTE — CONSULT NOTE ADULT - CONSULT REASON
Perforated transverse colon with feculent peritonitis, s/p ex lap, extended right hemicolectomy, mucus fistula created, remains on vasopressors and intubated

## 2025-04-10 NOTE — BRIEF OPERATIVE NOTE - OPERATION/FINDINGS
Perforation of proximal transverse colon with feculent peritonits. Cecum distended with patchy gangrene. There was also a non-traumatic large, non-expanding hematoma encasing the middle colic artery. We Performed an extended right hemicolectomy and felt a large dense mass at the rectosigmoid junction and decided to create and end ileostomy and mucus fistula. Abdomen was copiously irrigated as there was significant contamination of stool and food particles.

## 2025-04-10 NOTE — PROCEDURAL SAFETY CHECKLIST WITH OR WITHOUT SEDATION - NSPRESEDATIONFT_GEN_ALL_CORE
6 month follow up with PA          Thank you for attending your Cardiology appointment today. We aim to make your appointment as positive as possible.    We hope that your questions have been answered and you understand any plans that have been discussed. If you have any questions or concerns please call us at your convenience at [phone], this number will connect you directly with our nursing staff.    You may receive a survey by mail about your experience with us today. If you cannot give us the highest score possible for each question, we would appreciate any feedback you can give us on how we can do better in the future.    Please finalize the following details regarding your follow up visit-    1. Date and location of your follow up visit. If you would like to confirm your upcoming appointment, please contact our Department at [phone]. We will be happy to address your questions and concerns.    2. Please take a card with our contact information.    3. Please consider signing up for "[employer]"a. Go to [email or web address]. This will guide you in setting up an online account to view lab results, request medication refills, schedule an appointment, pay bills and send an email message and more.    Thank You.    Liang SWANN MD  [address] [place] [address] [phone]   Physician confirms case reviewed for anesthesia consultation.

## 2025-04-10 NOTE — PROCEDURE NOTE - NSPROCDETAILS_GEN_ALL_CORE
guidewire recovered
location identified, draped/prepped, sterile technique used, needle inserted/introduced/positive blood return obtained via catheter/connected to a pressurized flush line/sutured in place/Seldinger technique/all materials/supplies accounted for at end of procedure
Admission

## 2025-04-10 NOTE — BRIEF OPERATIVE NOTE - NSICDXBRIEFPREOP_GEN_ALL_CORE_FT
PRE-OP DIAGNOSIS:  Perforated viscus 10-Apr-2025 04:48:54  Vishnu Carty  Peritonitis 10-Apr-2025 04:49:04  Vishnu Carty

## 2025-04-10 NOTE — CHART NOTE - NSCHARTNOTEFT_GEN_A_CORE
SICU was bedside for transport to CT, patient became acutely hypotensive, levophed requirement increasing, CT postponned. Pt went into asystole, CODE blue called, compressions initiated, Rosc achieved total 2 times for approx 5-10sec, with return to asystole. total Epinephrine 8, bicarb 3amps, 2gm calcium, 4 amp D50. Total code lasted 20 minutes. Son Kev Hernandez, decided to no further escalation of care, time of death 4:40pm, Dr Montiel bedside, SICU attending Dr Amaya notified. SICU was bedside for transport to CT, patient became acutely hypotensive, levophed requirement increasing, CT postponned. Pt went into asystole, CODE blue called, compressions initiated, Rosc achieved total 2 times for approx 5-10sec, with return to asystole. total Epinephrine 8, bicarb 3amps, 2gm calcium, 4 amp D50, and 10u insulin. Total code lasted 20 minutes. Son Kev Hernandez, decided to no further escalation of care, time of death 4:40pm, Dr Montiel bedside, SICU attending Dr Amaya notified.

## 2025-04-10 NOTE — PROCEDURE NOTE - NSICDXPROCEDURE_GEN_ALL_CORE_FT
PROCEDURES:  Central venous catheter placement with ultrasound guidance 10-Apr-2025 19:37:58  Ana Weber

## 2025-04-10 NOTE — CONSULT NOTE ADULT - CRITICAL CARE ATTENDING COMMENT
Patient underwent Ex. Lap and Extended Right Hemicolectomy for perforated Colon  Had generalized feculent peritonitis.  Patient remains intubated post.  Vent 350/20/40%/PEEP 5  AB.18/38/285  On Levo and vaso  Unresponsive, no sedation  Has poor urine output.  Creat increasing, Lactate 6.2    PE;  Unresponsive  Chest: decreased breath sounds in bilateral lungs  CV : RRR  Abdomen: mildly distended    ASSESSMENT:  70 y/o female with Perforated Colon..  Pneumoperitoneum. Generalized feculent peritonitis.  Colon obstructing mass.  Ex. Lap and Extended Right Hemicolectomy for perforated Colon. Abdominal lavage.  On mechanical ventilation  Septic shock present on admission.  Acute lactic acidosis.  YASSINE. Hyperkalemia.    PLAN:  - keep off sedation as patient is unarousable  - consider CT head   - keep on Vent support; follow ABG  - keep MAP>65 - on Levo 0.2 mcg/kg/min and Vaso 0.03u  - get stat 2D Echo  - NPO, IVF  - ID - on Zosyn  - DVT prophyalxis

## 2025-04-10 NOTE — PROGRESS NOTE ADULT - ASSESSMENT
ASSESSMENT/PLAN:  69y y/o Female s/p ex lap with right hemicolectomy and mucus fistula creation.    PLAN:  - NPO w/ IVF  - NG tube  - Zosyn  - Protonix  - Monitor respiratory status  - Wean pressors as tolerated  - Management per SICU    SPECTRA: [id number].

## 2025-04-11 LAB
GRAM STN FLD: ABNORMAL
GRAM STN FLD: ABNORMAL

## 2025-04-12 LAB
-  AMPICILLIN/SULBACTAM: SIGNIFICANT CHANGE UP
-  AMPICILLIN: SIGNIFICANT CHANGE UP
-  AZTREONAM: SIGNIFICANT CHANGE UP
-  CEFAZOLIN: SIGNIFICANT CHANGE UP
-  CEFEPIME: SIGNIFICANT CHANGE UP
-  CEFOXITIN: SIGNIFICANT CHANGE UP
-  CEFTRIAXONE: SIGNIFICANT CHANGE UP
-  CIPROFLOXACIN: SIGNIFICANT CHANGE UP
-  ERTAPENEM: SIGNIFICANT CHANGE UP
-  GENTAMICIN: SIGNIFICANT CHANGE UP
-  IMIPENEM: SIGNIFICANT CHANGE UP
-  LEVOFLOXACIN: SIGNIFICANT CHANGE UP
-  MEROPENEM: SIGNIFICANT CHANGE UP
-  PIPERACILLIN/TAZOBACTAM: SIGNIFICANT CHANGE UP
-  TOBRAMYCIN: SIGNIFICANT CHANGE UP
-  TRIMETHOPRIM/SULFAMETHOXAZOLE: SIGNIFICANT CHANGE UP
CULTURE RESULTS: ABNORMAL
METHOD TYPE: SIGNIFICANT CHANGE UP
ORGANISM # SPEC MICROSCOPIC CNT: ABNORMAL
ORGANISM # SPEC MICROSCOPIC CNT: ABNORMAL
ORGANISM # SPEC MICROSCOPIC CNT: SIGNIFICANT CHANGE UP
SPECIMEN SOURCE: SIGNIFICANT CHANGE UP

## 2025-04-13 LAB
CULTURE RESULTS: ABNORMAL
SPECIMEN SOURCE: SIGNIFICANT CHANGE UP

## 2025-04-14 DIAGNOSIS — K55.069 ACUTE INFARCTION OF INTESTINE, PART AND EXTENT UNSPECIFIED: ICD-10-CM

## 2025-04-14 DIAGNOSIS — K65.9 PERITONITIS, UNSPECIFIED: ICD-10-CM

## 2025-04-14 DIAGNOSIS — K63.1 PERFORATION OF INTESTINE (NONTRAUMATIC): ICD-10-CM

## 2025-04-14 DIAGNOSIS — K63.89 OTHER SPECIFIED DISEASES OF INTESTINE: ICD-10-CM

## 2025-04-14 DIAGNOSIS — K59.00 CONSTIPATION, UNSPECIFIED: ICD-10-CM

## 2025-04-14 DIAGNOSIS — R65.21 SEVERE SEPSIS WITH SEPTIC SHOCK: ICD-10-CM

## 2025-04-14 DIAGNOSIS — E87.5 HYPERKALEMIA: ICD-10-CM

## 2025-04-14 DIAGNOSIS — K66.8 OTHER SPECIFIED DISORDERS OF PERITONEUM: ICD-10-CM

## 2025-04-14 DIAGNOSIS — A41.9 SEPSIS, UNSPECIFIED ORGANISM: ICD-10-CM

## 2025-04-14 DIAGNOSIS — N17.0 ACUTE KIDNEY FAILURE WITH TUBULAR NECROSIS: ICD-10-CM

## 2025-04-14 DIAGNOSIS — C19 MALIGNANT NEOPLASM OF RECTOSIGMOID JUNCTION: ICD-10-CM

## 2025-04-14 DIAGNOSIS — I46.9 CARDIAC ARREST, CAUSE UNSPECIFIED: ICD-10-CM

## 2025-04-14 DIAGNOSIS — F17.210 NICOTINE DEPENDENCE, CIGARETTES, UNCOMPLICATED: ICD-10-CM

## 2025-04-14 DIAGNOSIS — R58 HEMORRHAGE, NOT ELSEWHERE CLASSIFIED: ICD-10-CM

## 2025-04-14 DIAGNOSIS — K66.0 PERITONEAL ADHESIONS (POSTPROCEDURAL) (POSTINFECTION): ICD-10-CM

## 2025-04-14 DIAGNOSIS — K55.9 VASCULAR DISORDER OF INTESTINE, UNSPECIFIED: ICD-10-CM

## 2025-04-14 DIAGNOSIS — E87.21 ACUTE METABOLIC ACIDOSIS: ICD-10-CM

## 2025-04-14 DIAGNOSIS — J44.9 CHRONIC OBSTRUCTIVE PULMONARY DISEASE, UNSPECIFIED: ICD-10-CM

## 2025-04-15 LAB — SURGICAL PATHOLOGY STUDY: SIGNIFICANT CHANGE UP
